# Patient Record
Sex: MALE | Race: WHITE | Employment: OTHER | URBAN - METROPOLITAN AREA
[De-identification: names, ages, dates, MRNs, and addresses within clinical notes are randomized per-mention and may not be internally consistent; named-entity substitution may affect disease eponyms.]

---

## 2024-01-30 RX ORDER — IBUPROFEN 200 MG
200 TABLET ORAL EVERY 6 HOURS PRN
COMMUNITY
End: 2024-02-15

## 2024-01-30 RX ORDER — SIMVASTATIN 20 MG
20 TABLET ORAL NIGHTLY
COMMUNITY

## 2024-01-30 RX ORDER — LORATADINE 10 MG/1
10 TABLET, ORALLY DISINTEGRATING ORAL NIGHTLY
COMMUNITY

## 2024-01-30 RX ORDER — HYDROCODONE BITARTRATE AND ACETAMINOPHEN 5; 325 MG/1; MG/1
1 TABLET ORAL EVERY 6 HOURS PRN
COMMUNITY
End: 2024-02-15

## 2024-01-30 RX ORDER — GABAPENTIN 600 MG/1
600 TABLET ORAL NIGHTLY
COMMUNITY

## 2024-01-30 RX ORDER — ASPIRIN 81 MG/1
81 TABLET ORAL DAILY
Status: ON HOLD | COMMUNITY
End: 2024-02-15

## 2024-01-30 RX ORDER — DEXTROAMPHETAMINE SACCHARATE, AMPHETAMINE ASPARTATE, DEXTROAMPHETAMINE SULFATE AND AMPHETAMINE SULFATE 5; 5; 5; 5 MG/1; MG/1; MG/1; MG/1
20 TABLET ORAL DAILY
COMMUNITY

## 2024-01-30 RX ORDER — LISINOPRIL 5 MG/1
5 TABLET ORAL DAILY
COMMUNITY

## 2024-01-30 RX ORDER — DULAGLUTIDE 4.5 MG/.5ML
1 INJECTION, SOLUTION SUBCUTANEOUS WEEKLY
COMMUNITY

## 2024-01-30 NOTE — PAT NURSING NOTE
Patient took weekly dose of Trulicity on Sunday 28,2024. Spoke with April at Dr. Lee's office re protocol for holding Trulicity. Will await communication from office.

## 2024-02-10 ENCOUNTER — LAB ENCOUNTER (OUTPATIENT)
Dept: LAB | Facility: HOSPITAL | Age: 64
End: 2024-02-10
Attending: NEUROLOGICAL SURGERY
Payer: COMMERCIAL

## 2024-02-10 DIAGNOSIS — Z01.818 PRE-OP TESTING: ICD-10-CM

## 2024-02-10 LAB
ANTIBODY SCREEN: NEGATIVE
RH BLOOD TYPE: POSITIVE
RH BLOOD TYPE: POSITIVE

## 2024-02-10 PROCEDURE — 86850 RBC ANTIBODY SCREEN: CPT | Performed by: NEUROLOGICAL SURGERY

## 2024-02-10 PROCEDURE — 86901 BLOOD TYPING SEROLOGIC RH(D): CPT | Performed by: NEUROLOGICAL SURGERY

## 2024-02-10 PROCEDURE — 86900 BLOOD TYPING SEROLOGIC ABO: CPT | Performed by: NEUROLOGICAL SURGERY

## 2024-02-12 ENCOUNTER — TELEPHONE (OUTPATIENT)
Dept: SURGERY | Facility: CLINIC | Age: 64
End: 2024-02-12

## 2024-02-12 NOTE — TELEPHONE ENCOUNTER
Spoke with patient to inform to bring imaging disc day of procedure. Patient stated he will  a copy of the disc from [facility] and bring with him.

## 2024-02-14 ENCOUNTER — APPOINTMENT (OUTPATIENT)
Dept: GENERAL RADIOLOGY | Facility: HOSPITAL | Age: 64
End: 2024-02-14
Attending: NEUROLOGICAL SURGERY
Payer: COMMERCIAL

## 2024-02-14 ENCOUNTER — ANESTHESIA EVENT (OUTPATIENT)
Dept: SURGERY | Facility: HOSPITAL | Age: 64
End: 2024-02-14
Payer: COMMERCIAL

## 2024-02-14 ENCOUNTER — ANESTHESIA (OUTPATIENT)
Dept: SURGERY | Facility: HOSPITAL | Age: 64
End: 2024-02-14
Payer: COMMERCIAL

## 2024-02-14 ENCOUNTER — HOSPITAL ENCOUNTER (OUTPATIENT)
Facility: HOSPITAL | Age: 64
Discharge: HOME | End: 2024-02-15
Attending: NEUROLOGICAL SURGERY | Admitting: NEUROLOGICAL SURGERY
Payer: COMMERCIAL

## 2024-02-14 DIAGNOSIS — Z98.1 S/P LUMBAR FUSION: Primary | ICD-10-CM

## 2024-02-14 DIAGNOSIS — Z01.818 PRE-OP TESTING: ICD-10-CM

## 2024-02-14 PROBLEM — E78.5 HYPERLIPIDEMIA: Status: ACTIVE | Noted: 2024-02-14

## 2024-02-14 PROBLEM — M48.062 SPINAL STENOSIS OF LUMBAR REGION WITH NEUROGENIC CLAUDICATION: Status: ACTIVE | Noted: 2024-02-14

## 2024-02-14 PROBLEM — I10 ESSENTIAL HYPERTENSION: Status: ACTIVE | Noted: 2024-02-14

## 2024-02-14 PROBLEM — G47.33 OSA (OBSTRUCTIVE SLEEP APNEA): Status: ACTIVE | Noted: 2024-02-14

## 2024-02-14 PROBLEM — E11.9 DIABETES MELLITUS, TYPE 2 (HCC): Status: ACTIVE | Noted: 2024-02-14

## 2024-02-14 LAB
GLUCOSE BLDC GLUCOMTR-MCNC: 143 MG/DL (ref 70–99)
GLUCOSE BLDC GLUCOMTR-MCNC: 226 MG/DL (ref 70–99)
GLUCOSE BLDC GLUCOMTR-MCNC: 228 MG/DL (ref 70–99)
GLUCOSE BLDC GLUCOMTR-MCNC: 287 MG/DL (ref 70–99)

## 2024-02-14 PROCEDURE — 76000 FLUOROSCOPY <1 HR PHYS/QHP: CPT | Performed by: NEUROLOGICAL SURGERY

## 2024-02-14 PROCEDURE — 0SG00AJ FUSION OF LUMBAR VERTEBRAL JOINT WITH INTERBODY FUSION DEVICE, POSTERIOR APPROACH, ANTERIOR COLUMN, OPEN APPROACH: ICD-10-PCS | Performed by: NEUROLOGICAL SURGERY

## 2024-02-14 PROCEDURE — 0ST20ZZ RESECTION OF LUMBAR VERTEBRAL DISC, OPEN APPROACH: ICD-10-PCS | Performed by: NEUROLOGICAL SURGERY

## 2024-02-14 PROCEDURE — 0SG0071 FUSION OF LUMBAR VERTEBRAL JOINT WITH AUTOLOGOUS TISSUE SUBSTITUTE, POSTERIOR APPROACH, POSTERIOR COLUMN, OPEN APPROACH: ICD-10-PCS | Performed by: NEUROLOGICAL SURGERY

## 2024-02-14 DEVICE — SCREW SPNL DIA5.5MM OPN TULIP LOK RELINE: Type: IMPLANTABLE DEVICE | Site: BACK | Status: FUNCTIONAL

## 2024-02-14 DEVICE — IMPLANTABLE DEVICE: Type: IMPLANTABLE DEVICE | Site: BACK | Status: FUNCTIONAL

## 2024-02-14 DEVICE — K WIRE FIX NIT BVL BLNT TIP PRECEPT: Type: IMPLANTABLE DEVICE | Site: BACK

## 2024-02-14 DEVICE — ROD SPNL L40MM DIA5.5MM POST: Type: IMPLANTABLE DEVICE | Site: BACK | Status: FUNCTIONAL

## 2024-02-14 DEVICE — SCREW SPNL L50MM OD6.5MM 2C REDUC RELINE: Type: IMPLANTABLE DEVICE | Site: BACK | Status: FUNCTIONAL

## 2024-02-14 DEVICE — OSTEOCEL PRO LARGE BULK BUY: Type: IMPLANTABLE DEVICE | Site: BACK | Status: FUNCTIONAL

## 2024-02-14 RX ORDER — DEXAMETHASONE SODIUM PHOSPHATE 4 MG/ML
VIAL (ML) INJECTION AS NEEDED
Status: DISCONTINUED | OUTPATIENT
Start: 2024-02-14 | End: 2024-02-14 | Stop reason: SURG

## 2024-02-14 RX ORDER — PROCHLORPERAZINE EDISYLATE 5 MG/ML
5 INJECTION INTRAMUSCULAR; INTRAVENOUS EVERY 8 HOURS PRN
Status: DISCONTINUED | OUTPATIENT
Start: 2024-02-14 | End: 2024-02-14 | Stop reason: HOSPADM

## 2024-02-14 RX ORDER — HYDROCODONE BITARTRATE AND ACETAMINOPHEN 5; 325 MG/1; MG/1
1 TABLET ORAL EVERY 6 HOURS PRN
Status: DISCONTINUED | OUTPATIENT
Start: 2024-02-14 | End: 2024-02-15

## 2024-02-14 RX ORDER — BUPIVACAINE HYDROCHLORIDE AND EPINEPHRINE 2.5; 5 MG/ML; UG/ML
INJECTION, SOLUTION INFILTRATION; PERINEURAL AS NEEDED
Status: DISCONTINUED | OUTPATIENT
Start: 2024-02-14 | End: 2024-02-14 | Stop reason: HOSPADM

## 2024-02-14 RX ORDER — MORPHINE SULFATE 4 MG/ML
2 INJECTION, SOLUTION INTRAMUSCULAR; INTRAVENOUS EVERY 10 MIN PRN
Status: DISCONTINUED | OUTPATIENT
Start: 2024-02-14 | End: 2024-02-14 | Stop reason: HOSPADM

## 2024-02-14 RX ORDER — HYDROCODONE BITARTRATE AND ACETAMINOPHEN 10; 325 MG/1; MG/1
1 TABLET ORAL EVERY 6 HOURS PRN
Qty: 28 TABLET | Refills: 0 | Status: SHIPPED | OUTPATIENT
Start: 2024-02-14 | End: 2024-02-21

## 2024-02-14 RX ORDER — ONDANSETRON 2 MG/ML
4 INJECTION INTRAMUSCULAR; INTRAVENOUS EVERY 6 HOURS PRN
Status: DISCONTINUED | OUTPATIENT
Start: 2024-02-14 | End: 2024-02-14 | Stop reason: HOSPADM

## 2024-02-14 RX ORDER — ENEMA 19; 7 G/133ML; G/133ML
1 ENEMA RECTAL ONCE AS NEEDED
Status: DISCONTINUED | OUTPATIENT
Start: 2024-02-14 | End: 2024-02-15

## 2024-02-14 RX ORDER — PROCHLORPERAZINE EDISYLATE 5 MG/ML
5 INJECTION INTRAMUSCULAR; INTRAVENOUS EVERY 8 HOURS PRN
Status: DISCONTINUED | OUTPATIENT
Start: 2024-02-14 | End: 2024-02-15

## 2024-02-14 RX ORDER — POLYETHYLENE GLYCOL 3350 17 G/17G
17 POWDER, FOR SOLUTION ORAL DAILY PRN
Status: DISCONTINUED | OUTPATIENT
Start: 2024-02-14 | End: 2024-02-15

## 2024-02-14 RX ORDER — ACETAMINOPHEN 500 MG
1000 TABLET ORAL ONCE
Status: COMPLETED | OUTPATIENT
Start: 2024-02-14 | End: 2024-02-14

## 2024-02-14 RX ORDER — LABETALOL HYDROCHLORIDE 5 MG/ML
INJECTION, SOLUTION INTRAVENOUS AS NEEDED
Status: DISCONTINUED | OUTPATIENT
Start: 2024-02-14 | End: 2024-02-14 | Stop reason: SURG

## 2024-02-14 RX ORDER — SODIUM CHLORIDE, SODIUM LACTATE, POTASSIUM CHLORIDE, CALCIUM CHLORIDE 600; 310; 30; 20 MG/100ML; MG/100ML; MG/100ML; MG/100ML
INJECTION, SOLUTION INTRAVENOUS CONTINUOUS
Status: DISCONTINUED | OUTPATIENT
Start: 2024-02-14 | End: 2024-02-15

## 2024-02-14 RX ORDER — DOCUSATE SODIUM 100 MG/1
100 CAPSULE, LIQUID FILLED ORAL 2 TIMES DAILY
Status: DISCONTINUED | OUTPATIENT
Start: 2024-02-14 | End: 2024-02-15

## 2024-02-14 RX ORDER — HYDROMORPHONE HYDROCHLORIDE 1 MG/ML
0.2 INJECTION, SOLUTION INTRAMUSCULAR; INTRAVENOUS; SUBCUTANEOUS EVERY 2 HOUR PRN
Status: DISCONTINUED | OUTPATIENT
Start: 2024-02-14 | End: 2024-02-15

## 2024-02-14 RX ORDER — ONDANSETRON 2 MG/ML
4 INJECTION INTRAMUSCULAR; INTRAVENOUS EVERY 6 HOURS PRN
Status: DISCONTINUED | OUTPATIENT
Start: 2024-02-14 | End: 2024-02-15

## 2024-02-14 RX ORDER — GABAPENTIN 600 MG/1
600 TABLET ORAL NIGHTLY
Status: DISCONTINUED | OUTPATIENT
Start: 2024-02-14 | End: 2024-02-15

## 2024-02-14 RX ORDER — METHOCARBAMOL 750 MG/1
750 TABLET, FILM COATED ORAL 3 TIMES DAILY
Qty: 30 TABLET | Refills: 0 | Status: SHIPPED | OUTPATIENT
Start: 2024-02-14 | End: 2024-02-24

## 2024-02-14 RX ORDER — METHOCARBAMOL 750 MG/1
750 TABLET, FILM COATED ORAL 3 TIMES DAILY PRN
Status: DISCONTINUED | OUTPATIENT
Start: 2024-02-14 | End: 2024-02-15

## 2024-02-14 RX ORDER — HYDROCODONE BITARTRATE AND ACETAMINOPHEN 10; 325 MG/1; MG/1
2 TABLET ORAL EVERY 4 HOURS PRN
Status: DISCONTINUED | OUTPATIENT
Start: 2024-02-14 | End: 2024-02-15

## 2024-02-14 RX ORDER — NICOTINE POLACRILEX 4 MG
30 LOZENGE BUCCAL
Status: DISCONTINUED | OUTPATIENT
Start: 2024-02-14 | End: 2024-02-14 | Stop reason: HOSPADM

## 2024-02-14 RX ORDER — BISACODYL 10 MG
10 SUPPOSITORY, RECTAL RECTAL
Status: DISCONTINUED | OUTPATIENT
Start: 2024-02-14 | End: 2024-02-15

## 2024-02-14 RX ORDER — ROCURONIUM BROMIDE 10 MG/ML
INJECTION, SOLUTION INTRAVENOUS AS NEEDED
Status: DISCONTINUED | OUTPATIENT
Start: 2024-02-14 | End: 2024-02-14 | Stop reason: SURG

## 2024-02-14 RX ORDER — MORPHINE SULFATE 10 MG/ML
6 INJECTION, SOLUTION INTRAMUSCULAR; INTRAVENOUS EVERY 10 MIN PRN
Status: DISCONTINUED | OUTPATIENT
Start: 2024-02-14 | End: 2024-02-14 | Stop reason: HOSPADM

## 2024-02-14 RX ORDER — NICOTINE POLACRILEX 4 MG
15 LOZENGE BUCCAL
Status: DISCONTINUED | OUTPATIENT
Start: 2024-02-14 | End: 2024-02-14 | Stop reason: HOSPADM

## 2024-02-14 RX ORDER — HYDROMORPHONE HYDROCHLORIDE 1 MG/ML
0.2 INJECTION, SOLUTION INTRAMUSCULAR; INTRAVENOUS; SUBCUTANEOUS EVERY 5 MIN PRN
Status: DISCONTINUED | OUTPATIENT
Start: 2024-02-14 | End: 2024-02-14 | Stop reason: HOSPADM

## 2024-02-14 RX ORDER — LISINOPRIL 5 MG/1
5 TABLET ORAL DAILY
Status: DISCONTINUED | OUTPATIENT
Start: 2024-02-15 | End: 2024-02-15

## 2024-02-14 RX ORDER — CEFAZOLIN SODIUM/WATER 2 G/20 ML
2 SYRINGE (ML) INTRAVENOUS ONCE
Status: COMPLETED | OUTPATIENT
Start: 2024-02-14 | End: 2024-02-14

## 2024-02-14 RX ORDER — DIPHENHYDRAMINE HCL 25 MG
25 CAPSULE ORAL EVERY 4 HOURS PRN
Status: DISCONTINUED | OUTPATIENT
Start: 2024-02-14 | End: 2024-02-15

## 2024-02-14 RX ORDER — ATORVASTATIN CALCIUM 10 MG/1
10 TABLET, FILM COATED ORAL NIGHTLY
Status: DISCONTINUED | OUTPATIENT
Start: 2024-02-14 | End: 2024-02-15

## 2024-02-14 RX ORDER — LIDOCAINE HYDROCHLORIDE 10 MG/ML
INJECTION, SOLUTION EPIDURAL; INFILTRATION; INTRACAUDAL; PERINEURAL AS NEEDED
Status: DISCONTINUED | OUTPATIENT
Start: 2024-02-14 | End: 2024-02-14 | Stop reason: SURG

## 2024-02-14 RX ORDER — CETIRIZINE HYDROCHLORIDE 10 MG/1
10 TABLET ORAL NIGHTLY
Status: DISCONTINUED | OUTPATIENT
Start: 2024-02-14 | End: 2024-02-15

## 2024-02-14 RX ORDER — HYDROMORPHONE HYDROCHLORIDE 1 MG/ML
0.6 INJECTION, SOLUTION INTRAMUSCULAR; INTRAVENOUS; SUBCUTANEOUS EVERY 5 MIN PRN
Status: DISCONTINUED | OUTPATIENT
Start: 2024-02-14 | End: 2024-02-14 | Stop reason: HOSPADM

## 2024-02-14 RX ORDER — HYDROMORPHONE HYDROCHLORIDE 1 MG/ML
0.4 INJECTION, SOLUTION INTRAMUSCULAR; INTRAVENOUS; SUBCUTANEOUS EVERY 2 HOUR PRN
Status: DISCONTINUED | OUTPATIENT
Start: 2024-02-14 | End: 2024-02-15

## 2024-02-14 RX ORDER — DIPHENHYDRAMINE HYDROCHLORIDE 50 MG/ML
25 INJECTION INTRAMUSCULAR; INTRAVENOUS EVERY 4 HOURS PRN
Status: DISCONTINUED | OUTPATIENT
Start: 2024-02-14 | End: 2024-02-15

## 2024-02-14 RX ORDER — DEXTROSE MONOHYDRATE 25 G/50ML
50 INJECTION, SOLUTION INTRAVENOUS
Status: DISCONTINUED | OUTPATIENT
Start: 2024-02-14 | End: 2024-02-14 | Stop reason: HOSPADM

## 2024-02-14 RX ORDER — NALOXONE HYDROCHLORIDE 0.4 MG/ML
80 INJECTION, SOLUTION INTRAMUSCULAR; INTRAVENOUS; SUBCUTANEOUS AS NEEDED
Status: DISCONTINUED | OUTPATIENT
Start: 2024-02-14 | End: 2024-02-14 | Stop reason: HOSPADM

## 2024-02-14 RX ORDER — HYDROMORPHONE HYDROCHLORIDE 1 MG/ML
0.4 INJECTION, SOLUTION INTRAMUSCULAR; INTRAVENOUS; SUBCUTANEOUS EVERY 5 MIN PRN
Status: DISCONTINUED | OUTPATIENT
Start: 2024-02-14 | End: 2024-02-14 | Stop reason: HOSPADM

## 2024-02-14 RX ORDER — CEFAZOLIN SODIUM/WATER 2 G/20 ML
2 SYRINGE (ML) INTRAVENOUS EVERY 8 HOURS
Status: COMPLETED | OUTPATIENT
Start: 2024-02-14 | End: 2024-02-15

## 2024-02-14 RX ORDER — MORPHINE SULFATE 4 MG/ML
4 INJECTION, SOLUTION INTRAMUSCULAR; INTRAVENOUS EVERY 10 MIN PRN
Status: DISCONTINUED | OUTPATIENT
Start: 2024-02-14 | End: 2024-02-14 | Stop reason: HOSPADM

## 2024-02-14 RX ORDER — SODIUM CHLORIDE, SODIUM LACTATE, POTASSIUM CHLORIDE, CALCIUM CHLORIDE 600; 310; 30; 20 MG/100ML; MG/100ML; MG/100ML; MG/100ML
INJECTION, SOLUTION INTRAVENOUS CONTINUOUS
Status: DISCONTINUED | OUTPATIENT
Start: 2024-02-14 | End: 2024-02-14 | Stop reason: HOSPADM

## 2024-02-14 RX ORDER — HYDROCODONE BITARTRATE AND ACETAMINOPHEN 10; 325 MG/1; MG/1
2 TABLET ORAL EVERY 6 HOURS PRN
Status: DISCONTINUED | OUTPATIENT
Start: 2024-02-14 | End: 2024-02-14

## 2024-02-14 RX ORDER — SENNOSIDES 8.6 MG
17.2 TABLET ORAL NIGHTLY
Status: DISCONTINUED | OUTPATIENT
Start: 2024-02-14 | End: 2024-02-15

## 2024-02-14 RX ADMIN — SODIUM CHLORIDE, SODIUM LACTATE, POTASSIUM CHLORIDE, CALCIUM CHLORIDE: 600; 310; 30; 20 INJECTION, SOLUTION INTRAVENOUS at 13:48:00

## 2024-02-14 RX ADMIN — LABETALOL HYDROCHLORIDE 5 MG: 5 INJECTION, SOLUTION INTRAVENOUS at 14:19:00

## 2024-02-14 RX ADMIN — LABETALOL HYDROCHLORIDE 5 MG: 5 INJECTION, SOLUTION INTRAVENOUS at 14:08:00

## 2024-02-14 RX ADMIN — CEFAZOLIN SODIUM/WATER 2 G: 2 G/20 ML SYRINGE (ML) INTRAVENOUS at 12:31:00

## 2024-02-14 RX ADMIN — SODIUM CHLORIDE, SODIUM LACTATE, POTASSIUM CHLORIDE, CALCIUM CHLORIDE: 600; 310; 30; 20 INJECTION, SOLUTION INTRAVENOUS at 11:15:00

## 2024-02-14 RX ADMIN — LIDOCAINE HYDROCHLORIDE 50 MG: 10 INJECTION, SOLUTION EPIDURAL; INFILTRATION; INTRACAUDAL; PERINEURAL at 12:22:00

## 2024-02-14 RX ADMIN — LABETALOL HYDROCHLORIDE 5 MG: 5 INJECTION, SOLUTION INTRAVENOUS at 14:32:00

## 2024-02-14 RX ADMIN — DEXAMETHASONE SODIUM PHOSPHATE 8 MG: 4 MG/ML VIAL (ML) INJECTION at 12:37:00

## 2024-02-14 RX ADMIN — ROCURONIUM BROMIDE 10 MG: 10 INJECTION, SOLUTION INTRAVENOUS at 12:22:00

## 2024-02-14 RX ADMIN — SODIUM CHLORIDE, SODIUM LACTATE, POTASSIUM CHLORIDE, CALCIUM CHLORIDE: 600; 310; 30; 20 INJECTION, SOLUTION INTRAVENOUS at 12:24:00

## 2024-02-14 NOTE — DISCHARGE INSTRUCTIONS
Post- Operative Instructions- Lumbar:    Activity-     Avoid bending/twisting at the waist, reaching overhead, lifting more than 5-10 pounds and pushing/pulling more than 10-15 pounds. Log roll to get into and out of bed, instructions attached.    Avoid driving following your procedure. We will make driving recommendations at the time of your post-operative visit 2 weeks following your surgery. Do not drive while taking narcotic pain medication.     Avoid prolonged sitting/standing. Repositioning yourself frequently will prevent stiffness and promote blood flow which decreases your risk for a blood clot.     Avoid NSAID (Non-steroidal Anti-inflammatory Drugs] following your procedure unless approved by your provider. These medications include: Ibuprofen, Aleve, Advil, Meloxicam, Diclofenac, Celebrex.    Take pain medications as needed per instructions. Pain medications may cause constipation. If you experience constipation: drink plenty of water, increase your activity as tolerated and take an over-the-counter stool softener daily as needed.      Specific shower recommendations are listed below under “Incision” and are determined by your surgical closure type. All patients should avoid baths, swimming, hot tubs. Once it is safe for you to shower, do not allow water to spray directly on your incision site. Do not scrub or wash your incision. Gently pat dry following shower and avoid any lotions/creams/perfumes near your surgical site.    If instrumentation was placed during your procedure (cage, screws, plate) we recommend that you take antibiotics prior to any dental procedures for 12 months following your surgery. Antibiotic prescriptions are written and managed by your dentist.      Incision-    With clean hands, remove any surgical dressing 3 days following your procedure. A surgical dressing consists of gauze/large tape. Do not remove any glue or thin steri-strips. Staples and non-dissolvable sutures will be  removed at your two-week post-operative appointment in our clinic.    Surgical Glue will gradually come off on its own. Do not rub or pick at the glue. Surgical glue may darken over time. That is normal. You may shower the day after your procedure.    Steri-Strips are a sterile adhesive which appear tape-like. They are expected to fall off on their own over time. Do not pick at strips. You may shower 4 days after your procedure.      When to contact the clinic:    Drainage at your incision site. It is normal for your surgical dressing to have dried drainage following your procedure. If your incision is consistently leaking new drainage of any kind (bloody, clear, green/yellow tinged) please contact our office to notify your provider.     Changes in your incision site such as increased redness, swelling or warmth to the touch.     Leg pain that is associated with redness, swelling or warmth to the touch.     Bowel or bladder changes including incontinence/inability to make it to the bathroom in time.     New pain If you were experiencing pain prior to surgery, you may experience fluctuating pain levels in the same location as your nerves heal post-operatively. If you experience new pain in a location where you did not have pain prior to surgery, please contact the office to notify your provider.    New or worsening weakness in arms or legs    Fever above 101 degrees Fahrenheit It is normal for your body temperature to increase slightly following a surgical procedure. Please notify your physician if your temperature is above 101 degrees Fahrenheit.

## 2024-02-14 NOTE — PLAN OF CARE
FRANCESCO ANAYA M.D., [email or web address].JADON.JADON.[email or web address].     of Neurosurgery  [facility]  Board Certified Neurosurgeon                              [address]  [address] [place] [address]    PHONE  [phone]          FAX  [phone]    [email or web address]      THORACIC AND/OR LUMBAR FUSION DISCLAIMER AND CONSENT        2/14/2024  10:45 AM    PRE-OPERATIVE CONSULTATION     Derian Villagomez was again informed of the nature of the problem, planned treatment, indications and alternatives.  We discussed the use of hardware, including but not limited to intervertebral spacers, cages, screws, rods, plates, the use of biologics, etc.. We again reviewed the expected benefits of surgery, as well as all reasonably foreseeable risks, including but not limited to infection, bleeding requiring transfusion or reoperation, no relief or worsening of the pain, numbness, weakness, need for assistive devices to get around, injury to the nerves, paralysis, loss of control of the legs/bladder/bowel/sexual function, spinal fluid leak, scar formation, failure of the fusion to heal (made more likely with smoking,) bad position of the screws or cages, migration of the screws or cages, breakage of the screws or rods, potential problems above or below the level of fusion due to increased stress on those levels from the fusion and leading to more pain and possibly the need for more surgery, heart attack, blood clot formation, pulmonary embolism, stroke, coma and death. his questions were all answered and he understands what we discussed.  he also understands that no guarantees can be made regarding outcome or results.  Derian Villagomez agrees the benefits of surgery outweigh the risks, and wishes to proceed with surgery.      Francesco Anaya M.D., JASPAL.ADRIEN.S.

## 2024-02-14 NOTE — CONSULTS
[facility]    PATIENT'S NAME: KI LOMBARDO   ATTENDING PHYSICIAN: Francesco Lee MD   CONSULTING PHYSICIAN: Brittney Ware MD   PATIENT ACCOUNT#:   [id number]    LOCATION:  [facility] PACU 2 [facility] 10  MEDICAL RECORD #:   [id number]       YOB: 1960  ADMISSION DATE:       02/14/2024      CONSULT DATE:  02/14/2024    REPORT OF CONSULTATION      REASON FOR ADMISSION:  Post lumbar transforaminal interbody fusion.    HISTORY OF PRESENT ILLNESS:  Patient is a 63-year-old  male with neurogenic claudication radiating to the right lower extremity, failed outpatient conservative medical management options.  Scheduled today for above-mentioned procedure by his neurosurgeon, Dr. Francesco Lee.  Postprocedure, transferred to PACU for further monitoring.    PAST MEDICAL HISTORY:  Lumbar spinal stenosis with radiculopathy and neurogenic claudication, hypertension, obstructive sleep apnea, attention deficit disorder, diabetes mellitus type 2.    PAST SURGICAL HISTORY:  Hernia repair.    MEDICATIONS:  Please see medication reconciliation list.     ALLERGIES:  No known drug allergies.    SOCIAL HISTORY:  Social alcohol.  No tobacco use.  Cannabinoids vape recreationally.      FAMILY HISTORY:  Positive for hypertension and diabetes mellitus type 2.    REVIEW OF SYSTEMS:  Currently resting in bed.  Back discomfort.  No lower extremity tingling, numbness, or pain.  No chest pain.  No shortness of breath.  Other 12-point review of systems is negative..      PHYSICAL EXAMINATION:    GENERAL:  Alert and oriented to time, place and person.  Mild distress.   VITAL SIGNS:  Temperature 98.4, pulse 78, respiratory rate 12, blood pressure 149/97, pulse ox 95% on room air.  HEENT:  Atraumatic.  Oropharynx clear.  Moist mucous membranes.  Normal hard and soft palate.  Eyes:  Anicteric sclerae.    NECK:  Supple.  No lymphadenopathy.  Trachea midline.  Full range of motion.   LUNGS:  Clear to auscultation  bilaterally.  Normal respiratory effort.    HEART:  Regular rate and rhythm.  S1 and S2 auscultated.  No murmur.    ABDOMEN:  Soft, nondistended.  No tenderness.  Positive bowel sounds.   EXTREMITIES:  No edema, clubbing or cyanosis.   NEUROLOGIC:  Lumbar dressing noted.  Motor and sensory intact.      ASSESSMENT AND PLAN:    1.   Lumbar spinal stenosis with radiculopathy and neurogenic claudication, status post right L4-L5 transforaminal lumbar interbody fusion.  Pain control.  Neuro checks.  DVT prophylaxis.  Physical and occupational therapy.  2.   Diabetes mellitus type 2.  Continue home medications.  Monitor Accu-Cheks.  Sliding scale insulin.  3.   Essential hypertension.  Continue home medications and monitor.   4.   Hyperlipidemia.  Continue home medications.      Dictated By Brittney Ware MD  d: 02/14/2024 15:27:39  t: 02/14/2024 16:32:37  Job [id number]  FB/

## 2024-02-14 NOTE — ANESTHESIA POSTPROCEDURE EVALUATION
Patient: Derian Villagomez    Procedure Summary       Date: 02/14/24 Room / Location: [facility] MAIN OR 09 / [facility] MAIN OR    Anesthesia Start: 1218 Anesthesia Stop:     Procedure: Right L4-5 transforaminal lumbar interbody fusion (Right: Spine Lumbar) Diagnosis: (Lumbar stenosis with neurogenic claudication)    Surgeons: Francesco Lee MD Anesthesiologist: Finn Valadez MD    Anesthesia Type: general ASA Status: 2            Anesthesia Type: general    Vitals Value Taken Time   /87 02/14/24 1454   Temp 97.3 02/14/24 1456   Pulse 87 02/14/24 1456   Resp 12 02/14/24 1456   SpO2 94 % 02/14/24 1456   Vitals shown include unfiled device data.    EM AN Post Evaluation:   Patient Evaluated in PACU  Level of Consciousness: awake and alert and anxious  Pain Score: 0  Pain Management: adequate  Airway Patency:patent  Dental exam unchanged from preop    Nausea/Vomiting: none  Cardiovascular Status: acceptable  Respiratory Status: room air  Postoperative Hydration acceptable  Comments: Report to Meredith Goodwin CRNA  2/14/2024 2:56 PM

## 2024-02-14 NOTE — OPERATIVE REPORT
FRANCESCO ANAYA M.D., F.A.A.N.S.     of Neurosurgery  [facility]  Board Certified Neurosurgeon                              [address]  [address] [place] [address]    PHONE  [phone]          FAX  [phone]    [email or web address]          OPERATIVE REPORT    PATIENT NAME: Derian Villagomez    DATE OF OPERATION:  2/14/2024    PREOPERATIVE DIAGNOSIS:  1. Lumbar stenosis with neurogenic claudication             2. Lumbar spondylolisthesis    POSTOPERATIVE DIAGNOSIS: 1. same               2. same    OPERATIVE PROCEDURE:  1.  Lumbar 4 through 5 transforaminal intervertebral arthrodesis and fusion, utilizing titanium interbodies/cages and allograft and locally-harvested morselized autograft  2.  Lumbar 4 through 5 hemilaminectomy and right complete facetectomy and right posterolateral arthrodesis, utilizing locally-harvested morselized autograft  3.  Lumbar 4 through 5 pedicle screw and maria isabel instrumentation for augmentation of fusion purposes.  4.  Real time intraoperative fluoroscopy and C-arm with the image guidance.  5.  Real time intraoperative motor-evoked potentials, SSEP and nerve monitoring.    CPT CODES: 48423, 19908-57, 98699, 22853x1, 41253, 21526, 63627-31    SURGEON:  Francesco Anaya M.D.    ASSISTANT: Johan Buckner PA-C    ANESTHESIA: General endotracheal anesthesia with TIVA and local anesthetic.    ESTIMATED BLOOD LOSS: 20 cc    INTRAVENOUS FLUIDS AND URINE OUTPUT: Per anesthesia sheet    TRANSFUSION: None    IMPLANTS: Nuvasive    DRAIN: None    SPECIMEN: none    COMPLICATIONS: none    PROCEDURE:    After informed consent was obtained, the patient was brought to the operating room.  After the uneventful induction of general endotracheal anesthesia and placement of the Miner catheter, electrodes and monitoring devices were placed. The patient was then  positioned on the operating room table, an open-frame Song Table, in the prone position. The arms were supported and the neck physiologically extended. All pressure points were adequately padded. The patient's eyes were protected from exposure to prolonged pressure. Baseline electrophysiological potentials were obtained. Subsequently, we utilized lateral and AP fluoroscopic guidance to identify our incision sites relative to the lumbar bony anatomy corresponding to the correct level(s). We identified the incision sites for our tubular retractor placement as well as the percutaneous screws, contralaterally. They were marked out on the skin.  The patient was prepped and draped sterilely.  The C-arm was also draped sterilely into the field. Time-Out was done in the proper fashion. Perioperative antibiosis was given within one hour of incision.     After the \"Time-Out\", we infiltrated the incisions with our usual local anesthetic. We incised utilizing a 10-blade scalpel. The subcutaneous tissues were opened with Bovie cautery down to the fascia.  The fascia was opened sharply using both sharp and blunt dissection. We then used sequential dilators to place a tubular retractor at the L4-5 level along the right laminofacet junction. This was done with fluoroscopic guidance. The tubular retractor was then connected to a table-mounted arm for added stability.     The soft tissues overlying the laminofacet junction were resected utilizing mono- and bipolar electrocautery. We then used a high-speed maria g the create an ipsilateral laminectomy, undercutting the lamina contralaterally in order to accomplish a generous central decompression. The medial ispilateral facet was then drilled in order to decompress the lateral recess.  A high-speed maria g was then utilized to complete the generous laminectomy and the right L4 pars was then transected perpendicularly. This detached the L4 articulating facet en-bloc. We then drilled down  the corresponding L5 articulating surface to expose the L4-5 intervertebral space and disc. The rest of the bony decompression was accomplished utilizing Kerrison rongeurs. After the soft tissues, including the ligamentum flavum, had been resected, we visualized the ipsilaterally exiting L4 and the en-passage L5 nerve roots verifying adequate decompression. The disc space was then re-identified and some epidural veins around the disc space were coagulated and cut sharply with the microscissors.  The disc space was incised and a radical discectomy was performed using a series of Baljinder and Kerrison rongeurs, extending across the midline with angled curettes.  The cartilaginous endplates were then removed using a series of curettes, rongeurs, rasps, and maximo, removing the cartilaginous endplates and roughing up the bony endplates to get good bleeding bone along both surfaces for fusion purposes.  This was inspected with direct visualization and fluoroscopy repeatedly.  The wound was copiously irrigated. The nerves were maintained safe and protected with continued direct visualization. No additional disc or cartilaginous endplate was encountered and very good bleeding bony endplates were seen. We trialed for the properly-sized intervertebral cage and then filled it with allograft. About 9 cc of Osteocel Plus with some of the drilled autologous bone were impacted along the ventral aspect of the disc space and also to the right side of the disc space utilizing a funnel applicator and graft delivery system. While gently and carefully retracting the en-passage nerve root medially, the cage was impacted into position and 75 % expanded to proper alignment. Approximately, 12 degrees of segmental lordosis were gained.  Fluoroscopy confirmed good positioning of the cage. We irrigated the surgical site copiously and hemostasis was obtained with bipolar electrocautery and Flowseal. We then decorticated the ipsilateral  posterolateral elements generously extending from L4 to L5 with the high-speed maria g. The rest of our harvested autograft which had been denuded off all soft tissue and morselized was then packed into the posterolateral space and gutter extending from L4 through L5 for posterolateral arthrodesis purposes. The tubular retractor was then slowly removed, verifying hemostasis at every point of the way meticulously with bipolar electrocautery.      Next, after the corresponding incisions were opened with a 10-blade, a monitored Jamshidi needle was used to cannulate the pedicles from L4 to L5 in the traditional transpedicular fashion. We verified electrophysiological readings at good thresholds. The trajectories were fluoroscopically confirmed. Subsequently, the screws with their extenders were placed over guidewires utilizing fluoroscopic guidance. Finally, we tested and stimulated all screws electrophysiologically at appropriate thresholds.      At this point in the procedure we proceeded to finish the instrumentation. In cases where a segmental spondylolisthesis was present, we asked our anesthesia colleagues now to provide pharmacological paralysis so that the listhesis would be easier to reduce.  Utilizing calipers within the screw extenders, we measured for the properly-sized lordotic rods and placed them appropriately. These were placed through the guide channels of the extenders and easily placed into the screw heads.  Locking nuts were provisionally placed.  Then, using the torque  and the counter-torque device, each end of the maria isabel was secured into the tulip heads of the screws, compressing or reducing the construct lightly before tightening the rods.  The locking nuts were again revisited with the torque  to confirm tightness.  The screw extenders were then removed and final fluoroscopic images documented satisfactory position of the cage, screws, and rods.  The wound was copiously irrigated and small  bleeding points were controlled with a bipolar electrocautery.     The construct was again inspected and found to be quite satisfactory under direct visualization.  Still, more irrigation was used and the lumbodorsal fascia was closed using a series of interrupted 0 Vicryl sutures.  The subcutaneous tissues were copiously irrigated and closed with an interrupted inverted 3-0 Vicryl suture. The skin was closed with 4-0 Vicryl and Dermabond.    There were no complications.  All counts were reported correct. The nerve stimulation of the screws achieved satisfactory thresholds including final placement of the instrumentation.  The patient was returned to the supine position, extubated in the operating room, and taken to the recovery room in satisfactory condition, having tolerated the procedure well and moving all fours strongly to command.    Please fax a copy of this report to my office at [phone] and the Primary Care Provider of this patient.

## 2024-02-14 NOTE — ANESTHESIA PROCEDURE NOTES
Airway  Date/Time: 2/14/2024 12:24 PM    General Information and Staff    Patient location during procedure: OR  Anesthesiologist: Finn Valadez MD  Resident/CRNA: Khadijah Goodwin CRNA  Performed: CRNA   Performed by: Khadijah Goodwin CRNA  Authorized by: Finn Valadez MD      Indications and Patient Condition  Indications for airway management: airway protection and anesthesia  Spontaneous Ventilation: absent  Sedation level: deep  Preoxygenated: yes  Patient position: sniffing  Mask difficulty assessment: 1 - vent by mask    Final Airway Details  Final airway type: endotracheal airway      Successful airway: ETT  Cuffed: yes   Successful intubation technique: Video laryngoscopy  Facilitating devices/methods: intubating stylet  Endotracheal tube insertion site: oral  Blade type: painter.  Blade size: #4  ETT size (mm): 7.5    Cormack-Lehane Classification: grade I - full view of glottis  Measured from: teeth  ETT to teeth (cm): 23  Number of attempts at approach: 1  Ventilation between attempts: none  Number of other approaches attempted: 0

## 2024-02-14 NOTE — ANESTHESIA PREPROCEDURE EVALUATION
Anesthesia PreOp Note    HPI:     Derian Villagomez is a 63 year old male who presents for preoperative consultation requested by: Francesco Lee MD    Date of Surgery: 2/14/2024    Procedure(s):  Right L4-5 transforaminal lumbar interbody fusion  Indication: Lumbar stenosis with neurogenic claudication    Relevant Problems   No relevant active problems       NPO:  Last Liquid Consumption Date: 02/13/24  Last Liquid Consumption Time: 2300  Last Solid Consumption Date: 02/06/24  Last Solid Consumption Time: 2300  Last Liquid Consumption Date: 02/13/24          History Review:  There are no problems to display for this patient.      Past Medical History:   Diagnosis Date    Attention deficit disorder     Back problem     Diabetes (HCC)     High blood pressure     Sleep apnea     has cpap- does not use       Past Surgical History:   Procedure Laterality Date    ARTHROSCOPY OF JOINT UNLISTED Left     HERNIA SURGERY         Medications Prior to Admission   Medication Sig Dispense Refill Last Dose    amphetamine-dextroamphetamine 20 MG Oral Tab Take 1 tablet (20 mg total) by mouth daily.   2/13/2024    aspirin 81 MG Oral Tab EC Take 1 tablet (81 mg total) by mouth daily.   2/8/2024    metFORMIN 500 MG Oral Tab Take 1 tablet (500 mg total) by mouth 2 (two) times daily with meals.   2/12/2024    gabapentin 600 MG Oral Tab Take 1 tablet (600 mg total) by mouth at bedtime.   Past Week    HYDROcodone-acetaminophen 5-325 MG Oral Tab Take 1 tablet by mouth every 6 (six) hours as needed for Pain.   2/13/2024    ibuprofen (ADVIL) 200 MG Oral Tab Take 1 tablet (200 mg total) by mouth every 6 (six) hours as needed for Pain.   2/8/2024    lisinopril 5 MG Oral Tab Take 1 tablet (5 mg total) by mouth daily.   2/12/2024    simvastatin 20 MG Oral Tab Take 1 tablet (20 mg total) by mouth nightly.   Past Week    Dulaglutide (TRULICITY) 4.5 MG/0.5ML Subcutaneous Solution Pen-injector Inject 1 Dose into the skin once a week. Patient  instructed to not take any more trulicity before surgery   2/5/2024    loratadine 10 MG Oral Tablet Dispersible Take 1 tablet (10 mg total) by mouth at bedtime.   Past Week     Current Facility-Administered Medications Ordered in Epic   Medication Dose Route Frequency Provider Last Rate Last Admin    lactated ringers infusion   Intravenous Continuous Francesco Lee MD 20 mL/hr at 02/14/24 1126 Continued by Anesthesia at 02/14/24 1126    ceFAZolin (Ancef) 2 g in 20mL IV syringe premix  2 g Intravenous Once Francesco Lee MD         No current [facility]-ordered outpatient medications on file.       No Known Allergies    Family History   Problem Relation Age of Onset    Diabetes Father      Social History     Socioeconomic History    Marital status:    Tobacco Use    Smoking status: Never    Smokeless tobacco: Never   Vaping Use    Vaping Use: Every day    Substances: THC, CBD   Substance and Sexual Activity    Alcohol use: Yes     Comment: very rare    Drug use: Yes     Types: Cannabis       Available pre-op labs reviewed.     Lab Results   Component Value Date    PGLU 143 (H) 02/14/2024          Vital Signs:  Body mass index is 26.98 kg/m².   height is 1.778 m (5' 10\") and weight is 85.3 kg (188 lb). His oral temperature is 98.1 °F (36.7 °C). His blood pressure is 166/108 (abnormal) and his pulse is 87. His respiration is 18 and oxygen saturation is 95%.   Vitals:    02/09/24 1510 02/14/24 1057   BP:  (!) 166/108   Pulse:  87   Resp:  18   Temp:  98.1 °F (36.7 °C)   TempSrc:  Oral   SpO2:  95%   Weight: 84.4 kg (186 lb) 85.3 kg (188 lb)   Height: 1.778 m (5' 10\")         Anesthesia Evaluation     Patient summary reviewed and Nursing notes reviewed    Airway   Mallampati: I  Dental      Pulmonary - negative ROS   Cardiovascular   Exercise tolerance: good    NYHA Classification: I    Neuro/Psych - negative ROS     GI/Hepatic/Renal - negative ROS     Endo/Other - negative ROS   Abdominal                  Anesthesia Plan:    ASA:  2  Plan:   General  Airway:  ETT  Post-op Pain Management: IV analgesics      I have informed Derian Villagomez and/or legal guardian or family member of the nature of the anesthetic plan, benefits, risks including possible dental damage if relevant, major complications, and any alternative forms of anesthetic management.   All of the patient's questions were answered to the best of my ability. The patient desires the anesthetic management as planned.  MONICO SENA MD  2/14/2024 11:55 AM  Present on Admission:  **None**

## 2024-02-15 ENCOUNTER — TELEPHONE (OUTPATIENT)
Dept: SURGERY | Facility: CLINIC | Age: 64
End: 2024-02-15

## 2024-02-15 VITALS
HEART RATE: 96 BPM | RESPIRATION RATE: 16 BRPM | WEIGHT: 188 LBS | HEIGHT: 70 IN | SYSTOLIC BLOOD PRESSURE: 145 MMHG | DIASTOLIC BLOOD PRESSURE: 74 MMHG | TEMPERATURE: 99 F | BODY MASS INDEX: 26.92 KG/M2 | OXYGEN SATURATION: 97 %

## 2024-02-15 LAB
EST. AVERAGE GLUCOSE BLD GHB EST-MCNC: 157 MG/DL (ref 68–126)
GLUCOSE BLDC GLUCOMTR-MCNC: 165 MG/DL (ref 70–99)
HBA1C MFR BLD: 7.1 % (ref ?–5.7)

## 2024-02-15 PROCEDURE — 99024 POSTOP FOLLOW-UP VISIT: CPT | Performed by: NEUROLOGICAL SURGERY

## 2024-02-15 RX ORDER — POLYETHYLENE GLYCOL 3350 17 G/17G
17 POWDER, FOR SOLUTION ORAL DAILY PRN
Status: SHIPPED | COMMUNITY
Start: 2024-02-15

## 2024-02-15 RX ORDER — ASPIRIN 81 MG/1
81 TABLET ORAL DAILY
Status: SHIPPED | COMMUNITY
Start: 2024-02-15

## 2024-02-15 RX ORDER — PSEUDOEPHEDRINE HCL 30 MG
100 TABLET ORAL 2 TIMES DAILY
Status: SHIPPED | COMMUNITY
Start: 2024-02-15

## 2024-02-15 NOTE — PROGRESS NOTES
FRANCESCO ANAYA M.D., F.A.A.N.S.     of Neurosurgery  [facility] [place], [place]  Board Certified Neurosurgeon                              [facility]      [address]  [address]  [place] [address]    PHONE  [phone]          FAX  [phone]    [email or web address]      NEUROSURGERY PROGRESS NOTE          Derian Villagomez Patient Status:  Outpatient in a Bed    1960 MRN [id number]   Location [facility] Attending Francesco Anaya MD   Hosp Day # 0 PCP Jose Alberto Javier MD     Subjective:  Derian Villagomze is a(n) 63 year old male.  Alert, orientated x3.  Cooperative.  No apparent distress.  Doing well with resolution of preop radiculopathy    Vital Signs:    Temp:  [97.3 °F (36.3 °C)-98.6 °F (37 °C)] 98.4 °F (36.9 °C)  Pulse:  [72-99] 99  Resp:  [10-18] 16  BP: (125-174)/() 126/59  SpO2:  [94 %-99 %] 97 %    I/O:  I/O last 3 completed shifts:  In: 1000 [I.V.:1000]  Out: 2225 [Urine:; Blood:150]    Inpatient Medications:    Current Facility-Administered Medications:     lactated ringers infusion, , Intravenous, Continuous    methocarbamol (Robaxin) tab 750 mg, 750 mg, Oral, TID PRN    sennosides (Senokot) tab 17.2 mg, 17.2 mg, Oral, Nightly    docusate sodium (Colace) cap 100 mg, 100 mg, Oral, BID    polyethylene glycol (PEG 3350) (Miralax) 17 g oral packet 17 g, 17 g, Oral, Daily PRN    magnesium hydroxide (Milk of Magnesia) 400 MG/5ML oral suspension 30 mL, 30 mL, Oral, Daily PRN    bisacodyl (Dulcolax) 10 MG rectal suppository 10 mg, 10 mg, Rectal, Daily PRN    fleet enema (Fleet) 7-19 GM/118ML rectal enema 133 mL, 1 enema, Rectal, Once PRN    ondansetron (Zofran) 4 MG/2ML injection 4 mg, 4 mg, Intravenous, Q6H PRN    prochlorperazine (Compazine) 10 MG/2ML injection 5 mg, 5 mg, Intravenous, Q8H PRN    diphenhydrAMINE (Benadryl) cap/tab 25 mg, 25 mg, Oral, Q4H PRN **OR**  diphenhydrAMINE (Benadryl) 50 mg/mL  injection 25 mg, 25 mg, Intravenous, Q4H PRN    benzocaine-menthol (Cepacol) lozenge 1 lozenge, 1 lozenge, Buccal, Q15 Min PRN    HYDROmorphone (Dilaudid) 1 MG/ML injection 0.2 mg, 0.2 mg, Intravenous, Q2H PRN **OR** HYDROmorphone (Dilaudid) 1 MG/ML injection 0.4 mg, 0.4 mg, Intravenous, Q2H PRN    HYDROcodone-acetaminophen (Norco) 5-325 MG per tab 1 tablet, 1 tablet, Oral, Q6H PRN    gabapentin (Neurontin) tab 600 mg, 600 mg, Oral, Nightly    lisinopril (Prinivil; Zestril) tab 5 mg, 5 mg, Oral, Daily    cetirizine (ZyrTEC) tab 10 mg, 10 mg, Oral, Nightly    metFORMIN (Glucophage) tab 500 mg, 500 mg, Oral, BID with meals    atorvastatin (Lipitor) tab 10 mg, 10 mg, Oral, Nightly    HYDROcodone-acetaminophen (Norco)  MG per tab 2 tablet, 2 tablet, Oral, Q4H PRN    insulin aspart (NovoLOG) 100 Units/mL FlexPen 1-7 Units, 1-7 Units, Subcutaneous, TID CC and HS    Labs:  No results found for: \"WBC\", \"HGB\", \"PLT\", \"BUN\", \"NA\", \"K\", \"CO2\", \"GLU\", \"ALB\", \"PTT\", \"INR\", \"PT\", \"CRP\", \"ESRML\", \"ESRPF\"      Neurological Exam:  AAOX3, following commands  PERRLA  EOMI  MAEx4, 5/5 AMG  Sensation symmetrical  Incsisions c/d/i  Abdomen:  Soft, non-distended, non-tender, with no rebound or guarding.  No peritoneal signs.   Extremities:  Non-tender, no lower extremity edema noted.        Imaging:  XR FLUOROSCOPY C-ARM TIME LESS THAN 1 HOUR (CPT=76000)    Result Date: 2/14/2024  CONCLUSION:  1. Fluoroscopy for L4-5 transforaminal lumbar interbody fusion.    Dictated by (CST): Jeferson Ring MD on 2/14/2024 at 8:39 PM     Finalized by (CST): Jeferson Ring MD on 2/14/2024 at 8:40 PM           Assessment/Plan:  Principal Problem:    Spinal stenosis of lumbar region with neurogenic claudication  Active Problems:    Essential hypertension    Hyperlipidemia    Diabetes mellitus, type 2 (HCC)    SUMMER (obstructive sleep apnea)    S/P lumbar fusion    POD#1 s/p L4-5 TLIF, doing well.   Mobilize with  PTOT  Dispo  F/u in 2 weeks  D/w patient and nursing staff.    All questions and concerns were addressed. We appreciate the opportunity to participate in the care of this patient. Please do not hesitate to call our office ([phone]) with any issues.          Francesco Lee M.D., F.A.A.N.S.    2/15/2024  8:35 AM

## 2024-02-15 NOTE — PHYSICAL THERAPY NOTE
PHYSICAL THERAPY EVALUATION - INPATIENT     Room Number: Room 6/Room 6-A  Evaluation Date: 2/15/2024  Type of Evaluation: Initial   Physician Order: PT Eval and Treat    Presenting Problem: S/P L4-5 fusion 2/14/24  Co-Morbidities : DM2, SUMMER, lumbar stenosis with radiculopathy and neurogenic claudication  Reason for Therapy: Mobility Dysfunction and Discharge Planning    PHYSICAL THERAPY ASSESSMENT   Patient is a 63 year old male admitted 2/14/2024 for S/P L4-5 fusion.  Prior to admission, patient's baseline is independent, golfs and cycles.  Patient is currently functioning near baseline with bed mobility, transfers, gait, and stair negotiation.  Patient is requiring supervision and stand-by assist as a result of the following impairments: pain.  Pt progressed to independent with stair trial, ambulating household and community distances without a device.     Patient verbalizes no further mobility concerns at this time, is functioning safely with mobility to D/C at this level of care. Physical Therapy to sign off at this time, please re-consult if patient experiences a decline in functional status. Anticipate patient to return home with OP PT once cleared.      PLAN  Patient has been evaluated and presents with no skilled Physical Therapy needs at this time.  Patient will be discharged from Physical Therapy services. Please re-order if a new functional limitation presents during this admission.    PHYSICAL THERAPY MEDICAL/SOCIAL HISTORY       Problem List  Principal Problem:    Spinal stenosis of lumbar region with neurogenic claudication  Active Problems:    Essential hypertension    Hyperlipidemia    Diabetes mellitus, type 2 (HCC)    SUMMER (obstructive sleep apnea)    S/P lumbar fusion      HOME SITUATION  Home Situation  Type of Home: House  Home Layout: One level  Stairs to Enter : 0  Lives With: Spouse  Drives: Yes  Patient Owned Equipment: Cane  Patient Regularly Uses: Glasses;Reading glasses     Prior Level of  [place]: independent with no device, golfs, cycles    SUBJECTIVE  \"I am concerned about my bed\"     PHYSICAL THERAPY EXAMINATION   OBJECTIVE  Precautions: Spine  Fall Risk: Standard fall risk    WEIGHT BEARING RESTRICTION  Weight Bearing Restriction: None                PAIN ASSESSMENT  Ratin  Location: incision pain  Management Techniques: Activity promotion;Body mechanics;Relaxation  COGNITION  Overall Cognitive Status:  WFL - within functional limits    RANGE OF MOTION AND STRENGTH ASSESSMENT  Upper extremity ROM and strength are within functional limits  BUE within spinal precautions   Lower extremity ROM is within functional limits  BLE within spinal precautions   Lower extremity strength is within functional limits  BLE within spinal precautions     BALANCE  Static Sitting: Good  Dynamic Sitting: Good  Static Standing: Fair +  Dynamic Standing: Fair +      NEUROLOGICAL FINDINGS        Coordination - Rapid Alternating Movement: Symmetrical (finger to finger symmetrical)  Sensation: WFL bilat LE to light touch              AM-PAC '6-Clicks' INPATIENT SHORT FORM - BASIC MOBILITY  How much difficulty does the patient currently have...  Patient Difficulty: Turning over in bed (including adjusting bedclothes, sheets and blankets)?: None   Patient Difficulty: Sitting down on and standing up from a chair with arms (e.g., wheelchair, bedside commode, etc.): None   Patient Difficulty: Moving from lying on back to sitting on the side of the bed?: A Little   How much help from another person does the patient currently need...   Help from Another: Moving to and from a bed to a chair (including a wheelchair)?: None   Help from Another: Need to walk in hospital room?: None   Help from Another: Climbing 3-5 steps with a railing?: A Little     AM-PAC Score:  Raw Score: 22   Approx Degree of Impairment: 20.91%   Standardized Score (AM-PAC Scale): 53.28   CMS Modifier (G-Code): CJ    FUNCTIONAL ABILITY  STATUS  Functional Mobility/Gait Assessment  Gait Assistance: Supervision;Independent  Distance (ft): 200  Assistive Device: None;Rolling walker (RW for first 10', then no device)  Pattern: Within Functional Limits;Comment;Shuffle (moderate ines, dec step length bilat, upright posture, no LOB)  Stairs: Stairs  How Many Stairs: 8  Device: 2 Rails  Assist: Contact guard assist (progressed to supervision)  Pattern: Ascend and Descend  Ascend and Descend : Step to  Rolling: independent and supervision log roll  Supine to Sit: independent and stand-by assist cues and demonstration for sequencing   Sit to Supine: independent and stand-by assist  Sit to Stand: independent and stand-by assist cues for wide base of support, hinging at hips   ADDITIONAL SESSION DETAIL  Provided pt handout on surgical precautions and functional mobility.     Patient received  standing at maria eugenia in room , agreeable to physical therapy evaluation. Vital signs monitored as noted above, no adverse symptoms and patient stable during session. Education with patient and wife provided on Spine precautions, Physical therapy plan of care, and physiological benefits of out of bed mobility.     Patient history and/or personal factors that may impact the plan of care include longstanding history of pain. Based on the physical therapy examination of the noted systems and functional activity/participation limitations, the patient presentation is stable given the patient demonstrates no significant barriers to meeting therapy goals.    Exercise/Education Provided:  Bed mobility  Body mechanics  Energy conservation  Functional activity tolerated  Gait training  Posture  ROM  Transfer training    The patient's Approx Degree of Impairment: 20.91% has been calculated based on documentation in the [facility] '6 clicks' Inpatient Basic Mobility Short Form.  Research supports that patients with this level of impairment may benefit from no needs, however recommend OP PT  once medically cleared.    Final disposition will be made by interdisciplinary medical team.    Patient End of Session: Up in chair;Needs met;Call light within reach;RN aware of session/findings;All patient questions and concerns addressed;Ice applied;Family present (wife)    Patient was able to achieve the following ...   Patient able to transfer  Safely and independently    Patient able to ambulate on level surfaces  Safely and independently     Patient Evaluation Complexity Level:  History High - 3 or more personal factors and/or co-morbidities   Examination of body systems Moderate - addressing a total of 3 or more elements   Clinical Presentation Low- Stable   Clinical Decision Making  Low Complexity     Gait Training: 15 minutes  Therapeutic Activity:  15 minutes    KEVIN Haskins  [facility] [facility]  DPT Candidate 2024     I provided clinical instruction and supervision for the duration of this session and agree with the above documentation.    Dina Bañuelos, PT, DPT  [facility]

## 2024-02-15 NOTE — PLAN OF CARE
Problem: Patient Centered Care  Goal: Patient preferences are identified and integrated in the patient's plan of care  Description: Interventions:  - What would you like us to know as we care for you? Patient is from home with his wife and she is his support system.  - Provide timely, complete, and accurate information to patient/family  - Incorporate patient and family knowledge, values, beliefs, and cultural backgrounds into the planning and delivery of care  - Encourage patient/family to participate in care and decision-making at the level they choose  - Honor patient and family perspectives and choices  Outcome: Progressing     Problem: Diabetes/Glucose Control  Goal: Glucose maintained within prescribed range  Description: INTERVENTIONS:  - Monitor Blood Glucose as ordered  - Assess for signs and symptoms of hyperglycemia and hypoglycemia  - Administer ordered medications to maintain glucose within target range  - Assess barriers to adequate nutritional intake and initiate nutrition consult as needed  - Instruct patient on self management of diabetes  Outcome: Progressing     Problem: Patient/Family Goals  Goal: Patient/Family Long Term Goal  Description: Patient's Long Term Goal: Patient will have his pain managed with oral medication and will not have any complications from surgery.    Interventions:  - No bending, heavy lifting nor twisting above waist area.  - Log rolling in and out of bed.  - Pain managed with oral medication.  - May ice incision to prevent swelling or help reduce pain.  - Up with walker as much as tolerated.  - KRYS hose to both legs to prevent blood clots.  - See additional Care Plan goals for specific interventions  Outcome: Progressing  Goal: Patient/Family Short Term Goal  Description: Patient's Short Term Goal: Home when stable.    Interventions:   - No bending, heavy lifting nor twisting above waist area.  - Log rolling in and out of bed.  - Pain managed with oral medication.  -  May ice incision to prevent swelling or help reduce pain.  - Up with walker as much as tolerated.  - SCDs and KRYS hose to both legs to prevent blood clots.  - PT/OT as ordered.  - See additional Care Plan goals for specific interventions  Outcome: Progressing     Problem: PAIN - ADULT  Goal: Verbalizes/displays adequate comfort level or patient's stated pain goal  Description: INTERVENTIONS:  - Encourage pt to monitor pain and request assistance  - Assess pain using appropriate pain scale  - Administer analgesics based on type and severity of pain and evaluate response  - Implement non-pharmacological measures as appropriate and evaluate response  - Consider cultural and social influences on pain and pain management  - Manage/alleviate anxiety  - Utilize distraction and/or relaxation techniques  - Monitor for opioid side effects  - Notify MD/LIP if interventions unsuccessful or patient reports new pain  - Anticipate increased pain with activity and pre-medicate as appropriate  Outcome: Progressing     Problem: RISK FOR INFECTION - ADULT  Goal: Absence of fever/infection during anticipated neutropenic period  Description: INTERVENTIONS  - Monitor WBC  - Administer growth factors as ordered  - Implement neutropenic guidelines  Outcome: Progressing     Problem: SAFETY ADULT - FALL  Goal: Free from fall injury  Description: INTERVENTIONS:  - Assess pt frequently for physical needs  - Identify cognitive and physical deficits and behaviors that affect risk of falls.  - [place] fall precautions as indicated by assessment.  - Educate pt/family on patient safety including physical limitations  - Instruct pt to call for assistance with activity based on assessment  - Modify environment to reduce risk of injury  - Provide assistive devices as appropriate  - Consider OT/PT consult to assist with strengthening/mobility  - Encourage toileting schedule  Outcome: Progressing     Problem: DISCHARGE PLANNING  Goal: Discharge to  home or other facility with appropriate resources  Description: INTERVENTIONS:  - Identify barriers to discharge w/pt and caregiver  - Include patient/family/discharge partner in discharge planning  - Arrange for needed discharge resources and transportation as appropriate  - Identify discharge learning needs (meds, wound care, etc)  - Arrange for interpreters to assist at discharge as needed  - Consider post-discharge preferences of patient/family/discharge partner  - Complete POLST form as appropriate  - Assess patient's ability to be responsible for managing their own health  - Refer to Case Management Department for coordinating discharge planning if the patient needs post-hospital services based on physician/LIP order or complex needs related to functional status, cognitive ability or social support system  Outcome: Progressing     Problem: GENITOURINARY - ADULT  Goal: Absence of urinary retention  Description: INTERVENTIONS:  - Assess patient’s ability to void and empty bladder  - Monitor intake/output and perform bladder scan as needed  - Follow urinary retention protocol/standard of care  - Consider collaborating with pharmacy to review patient's medication profile  - Implement strategies to promote bladder emptying  Outcome: Progressing     Problem: SKIN/TISSUE INTEGRITY - ADULT  Goal: Incision(s), wounds(s) or drain site(s) healing without S/S of infection  Description: INTERVENTIONS:  - Assess and document risk factors for pressure ulcer development  - Assess and document skin integrity  - Assess and document dressing/incision, wound bed, drain sites and surrounding tissue  - Implement wound care per orders  - Initiate isolation precautions as appropriate  - Initiate Pressure Ulcer prevention bundle as indicated  Outcome: Progressing     Problem: MUSCULOSKELETAL - ADULT  Goal: Return mobility to safest level of function  Description: INTERVENTIONS:  - Assess patient stability and activity tolerance for  standing, transferring and ambulating w/ or w/o assistive devices  - Assist with transfers and ambulation using safe patient handling equipment as needed  - Ensure adequate protection for wounds/incisions during mobilization  - Obtain PT/OT consults as needed  - Advance activity as appropriate  - Communicate ordered activity level and limitations with patient/family  Outcome: Progressing  Goal: Maintain proper alignment of affected body part  Description: INTERVENTIONS:  - Support and protect limb and body alignment per provider's orders  - Instruct and reinforce with patient and family use of appropriate assistive device and precautions (e.g. spinal or hip dislocation precautions)  Outcome: Progressing    Patient is alert and oriented, aware to call for help as needed.  Patient is currently in room air, denies shortness of breathing nor chest pain.  Patient was up in the chair upon arrival from PACU then back to bed now.  Patient takes oral medication for pain.  Patient has a hooker catheter draining to yellow colored urine in good amount.  Patient will be going home when stable hopefully tomorrow.

## 2024-02-15 NOTE — PLAN OF CARE
Patient is alert and oriented. RA. Tele in place. SCDs on for DVT prophylaxis. Miner removed this AM, patient will be check void. Up x1 with walker. Incision CDI, shaggy. Gel ice packs. PRN Dilaudid, norco and robaxin given for pain management. Call light within reach.  Problem: Patient Centered Care  Goal: Patient preferences are identified and integrated in the patient's plan of care  Description: Interventions:  - What would you like us to know as we care for you? Patient is from home with his wife and she is his support system.  - Provide timely, complete, and accurate information to patient/family  - Incorporate patient and family knowledge, values, beliefs, and cultural backgrounds into the planning and delivery of care  - Encourage patient/family to participate in care and decision-making at the level they choose  - Honor patient and family perspectives and choices  Outcome: Progressing     Problem: Diabetes/Glucose Control  Goal: Glucose maintained within prescribed range  Description: INTERVENTIONS:  - Monitor Blood Glucose as ordered  - Assess for signs and symptoms of hyperglycemia and hypoglycemia  - Administer ordered medications to maintain glucose within target range  - Assess barriers to adequate nutritional intake and initiate nutrition consult as needed  - Instruct patient on self management of diabetes  Outcome: Progressing     Problem: Patient/Family Goals  Goal: Patient/Family Long Term Goal  Description: Patient's Long Term Goal: Patient will have his pain managed with oral medication and will not have any complications from surgery.    Interventions:  - No bending, heavy lifting nor twisting above waist area.  - Log rolling in and out of bed.  - Pain managed with oral medication.  - May ice incision to prevent swelling or help reduce pain.  - Up with walker as much as tolerated.  - KRYS hose to both legs to prevent blood clots.  - See additional Care Plan goals for specific interventions  Outcome:  Progressing  Goal: Patient/Family Short Term Goal  Description: Patient's Short Term Goal: Home when stable.    Interventions:   - No bending, heavy lifting nor twisting above waist area.  - Log rolling in and out of bed.  - Pain managed with oral medication.  - May ice incision to prevent swelling or help reduce pain.  - Up with walker as much as tolerated.  - SCDs and KRYS hose to both legs to prevent blood clots.  - PT/OT as ordered.  - See additional Care Plan goals for specific interventions  Outcome: Progressing     Problem: PAIN - ADULT  Goal: Verbalizes/displays adequate comfort level or patient's stated pain goal  Description: INTERVENTIONS:  - Encourage pt to monitor pain and request assistance  - Assess pain using appropriate pain scale  - Administer analgesics based on type and severity of pain and evaluate response  - Implement non-pharmacological measures as appropriate and evaluate response  - Consider cultural and social influences on pain and pain management  - Manage/alleviate anxiety  - Utilize distraction and/or relaxation techniques  - Monitor for opioid side effects  - Notify MD/LIP if interventions unsuccessful or patient reports new pain  - Anticipate increased pain with activity and pre-medicate as appropriate  Outcome: Progressing     Problem: RISK FOR INFECTION - ADULT  Goal: Absence of fever/infection during anticipated neutropenic period  Description: INTERVENTIONS  - Monitor WBC  - Administer growth factors as ordered  - Implement neutropenic guidelines  Outcome: Progressing     Problem: SAFETY ADULT - FALL  Goal: Free from fall injury  Description: INTERVENTIONS:  - Assess pt frequently for physical needs  - Identify cognitive and physical deficits and behaviors that affect risk of falls.  - [place] fall precautions as indicated by assessment.  - Educate pt/family on patient safety including physical limitations  - Instruct pt to call for assistance with activity based on  assessment  - Modify environment to reduce risk of injury  - Provide assistive devices as appropriate  - Consider OT/PT consult to assist with strengthening/mobility  - Encourage toileting schedule  Outcome: Progressing     Problem: DISCHARGE PLANNING  Goal: Discharge to home or other facility with appropriate resources  Description: INTERVENTIONS:  - Identify barriers to discharge w/pt and caregiver  - Include patient/family/discharge partner in discharge planning  - Arrange for needed discharge resources and transportation as appropriate  - Identify discharge learning needs (meds, wound care, etc)  - Arrange for interpreters to assist at discharge as needed  - Consider post-discharge preferences of patient/family/discharge partner  - Complete POLST form as appropriate  - Assess patient's ability to be responsible for managing their own health  - Refer to Case Management Department for coordinating discharge planning if the patient needs post-hospital services based on physician/LIP order or complex needs related to functional status, cognitive ability or social support system  Outcome: Progressing     Problem: GENITOURINARY - ADULT  Goal: Absence of urinary retention  Description: INTERVENTIONS:  - Assess patient’s ability to void and empty bladder  - Monitor intake/output and perform bladder scan as needed  - Follow urinary retention protocol/standard of care  - Consider collaborating with pharmacy to review patient's medication profile  - Implement strategies to promote bladder emptying  Outcome: Progressing     Problem: SKIN/TISSUE INTEGRITY - ADULT  Goal: Incision(s), wounds(s) or drain site(s) healing without S/S of infection  Description: INTERVENTIONS:  - Assess and document risk factors for pressure ulcer development  - Assess and document skin integrity  - Assess and document dressing/incision, wound bed, drain sites and surrounding tissue  - Implement wound care per orders  - Initiate isolation  precautions as appropriate  - Initiate Pressure Ulcer prevention bundle as indicated  Outcome: Progressing     Problem: MUSCULOSKELETAL - ADULT  Goal: Return mobility to safest level of function  Description: INTERVENTIONS:  - Assess patient stability and activity tolerance for standing, transferring and ambulating w/ or w/o assistive devices  - Assist with transfers and ambulation using safe patient handling equipment as needed  - Ensure adequate protection for wounds/incisions during mobilization  - Obtain PT/OT consults as needed  - Advance activity as appropriate  - Communicate ordered activity level and limitations with patient/family  Outcome: Progressing  Goal: Maintain proper alignment of affected body part  Description: INTERVENTIONS:  - Support and protect limb and body alignment per provider's orders  - Instruct and reinforce with patient and family use of appropriate assistive device and precautions (e.g. spinal or hip dislocation precautions)  Outcome: Progressing

## 2024-02-15 NOTE — TELEPHONE ENCOUNTER
Received call from pharmacist Celena at [facility] regarding norco 10mg rx prescribed yesterday for post op pain.  He is s/p Right L4-5 TLIF performed on 2-14-24 by Dr Lee.  The pharmacist states the patient is also being prescribed norco 5mg and filled #120 on 1-26-24.  She had called the patient who told her he was already out of the 5 mg (30 day supply) and the pharmacist wanted to know why he was being prescribed more.  Explained that patient had surgery yesterday and needs additional pain management.  She would like approval from EDISON Buckner as he prescribed the rx.  She also asked for the attending MD Dr Lee's STEVE.      Discussed with EDIOSN Buckner.  He states the patient had increased pain leading up to surgery and was taking 2 tabs of norco 5 mg at a time so was out early and needs a refill for post op pain.      Called [facility] back to inform.

## 2024-02-16 NOTE — OCCUPATIONAL THERAPY NOTE
OCCUPATIONAL THERAPY EVALUATION - INPATIENT     Room Number: Room 6/Room 6-A  Evaluation Date: 2/15/2024  Type of Evaluation: Initial  Presenting Problem: L4-5 TLIF    Physician Order: IP Consult to Occupational Therapy  Reason for Therapy: ADL/IADL Dysfunction and Discharge Planning    OCCUPATIONAL THERAPY ASSESSMENT   Patient is a 63 year old male admitted 2024.  Prior to admission, patient's baseline is independent.  Patient is currently functioning near baseline with ADLs.  Patient is requiring modified independent and supervision as a result of the following impairments: pain and back prec .     Patient does not require skilled OT Services For duration of hospitalization, given the patient is functioning near baseline level do not anticipate skilled therapy needs at discharge     PLAN  Patient has been evaluated and presents with no skilled Occupational Therapy needs  at this time.  Patient will be discharged from Occupational Therapy services. Please re-order if a new functional limitation presents during this admission.  OT Device Recommendations: Reacher    OCCUPATIONAL THERAPY MEDICAL/SOCIAL HISTORY   Problem List  Principal Problem:    Spinal stenosis of lumbar region with neurogenic claudication  Active Problems:    Essential hypertension    Hyperlipidemia    Diabetes mellitus, type 2 (HCC)    SUMMER (obstructive sleep apnea)    S/P lumbar fusion    HOME SITUATION  Type of Home: House  Home Layout: One level  Lives With: Spouse  Toilet and Equipment: Standard height toilet  Drives: Yes  Patient Regularly Uses: Reading glasses      Prior Level of [place]: independent    SUBJECTIVE  \"I have been in pain for so long, I have been modifying all of my daily activities\"    OCCUPATIONAL THERAPY EXAMINATION    OBJECTIVE  Precautions: Spine  Fall Risk: Standard fall risk    WEIGHT BEARING RESTRICTION     PAIN ASSESSMENT  Ratin  Location: back  Management Techniques: Ice         COGNITION  Overall  Cognitive Status:  WFL - within functional limits      Communication: WFL    Behavioral/Emotional/Social: WFL    RANGE OF MOTION   Upper extremity ROM is within functional limits     STRENGTH ASSESSMENT  Upper extremity strength is within functional limits     COORDINATION  Gross Motor: WFL   Fine Motor: WFL     ACTIVITIES OF DAILY LIVING ASSESSMENT  AM-PAC ‘6-Clicks’ Inpatient Daily Activity Short Form  How much help from another person does the patient currently need…  -   Putting on and taking off regular lower body clothing?: None  -   Bathing (including washing, rinsing, drying)?: A Little  -   Toileting, which includes using toilet, bedpan or urinal? : None  -   Putting on and taking off regular upper body clothing?: None  -   Taking care of personal grooming such as brushing teeth?: None  -   Eating meals?: None    AM-PAC Score:  Score: 23  Approx Degree of Impairment: 15.86%  Standardized Score (AM-PAC Scale): 51.12  CMS Modifier (G-Code): CI    FUNCTIONAL TRANSFER ASSESSMENT  Sit to Stand: Chair  Chair: Modified Independent       BALANCE ASSESSMENT  Static Sitting: Independent  Static Standing: Independent    FUNCTIONAL ADL ASSESSMENT  Eating: Independent  Grooming Standing: Modified Independent (simulated reaching in standing, cues to reinforce maintaining back prec during ADLs)  LB Dressing Seated: Modified Independent (able to achieve figure four)  Toileting Standing: Modified Independent (simulated reaching in standing, cues to reinforce/maintain back prec)       Skilled Therapy Provided:   OT PPE includes: gloves, and surgical mask. RN cleared pt for OT. Wife present during session, educated on back prec and how to maintain during ADLs-pt and wife with good understanding. Educated on safe functional transfers car/shower. Demo'd out of chair with SPV-mod I. All questions answered    EDUCATION PROVIDED  Patient: Role of Occupational Therapy; Plan of Care; Discharge Recommendations; Adaptive Equipment  Recommendations; Posture/Positioning; Surgical Precautions; Proper Body Mechanics  Patient's Response to Education: Verbalized Understanding; Returned Demonstration  Family/Caregiver: Role of Occupational Therapy; Plan of Care; Discharge Recommendations; Adaptive Equipment Recommendations; Compensatory ADL Techniques; Surgical Precautions; Proper Body Mechanics  Family/Caregiver's Response to Education: Verbalized Understanding; Returned Demonstration    The patient's Approx Degree of Impairment: 15.86% has been calculated based on documentation in the [facility] '6 clicks' Inpatient Daily Activity Short Form.  Research supports that patients with this level of impairment may benefit from none  .  Final disposition will be made by interdisciplinary medical team.    Patient End of Session: Up in chair;Call light within reach;Needs met;RN aware of session/findings;All patient questions and concerns addressed;Family present    Patient was able to achieve the following ...   Patient able to toilet transfer  safely and independently    Patient able to dress lower extremities  safely and independently    Patient/Caregiver able to demonstrate safety with ADLS  safely and independently     Patient Evaluation Complexity Level:   Occupational Profile/Medical History LOW - Brief history including review of medical or therapy records    Specific performance deficits impacting engagement in ADL/IADL LOW  1 - 3 performance deficits    Client Assessment/Performance Deficits LOW - No comorbidities nor modifications of tasks    Clinical Decision Making LOW - Analysis of occupational profile, problem-focused assessments, limited treatment options    Overall Complexity LOW     OT Session Time: 15 minutes  15 min KATE Ballard/ORIANA

## 2024-02-19 ENCOUNTER — TELEPHONE (OUTPATIENT)
Dept: SURGERY | Facility: CLINIC | Age: 64
End: 2024-02-19

## 2024-02-19 DIAGNOSIS — Z98.1 S/P LUMBAR FUSION: Primary | ICD-10-CM

## 2024-02-19 NOTE — TELEPHONE ENCOUNTER
Patient is requesting to speak with clinical staff in regards to refill on Donaldson medication. Patient feels he was under prescribed. He then had to use medication he already had in his medicine cabinet.  Patient is requesting to have a quantity of 56 for Norco medication.     Patient is having abnormal pain in back an seen dry blood spotting on his shirt. Patient states has swelling     Patient also would like to review icing procedure.       Preferred pharmacy:[id number] [place] [id number] [address] [place] [facility] [address], [phone], [phone] [[id number]]

## 2024-02-19 NOTE — TELEPHONE ENCOUNTER
Noted the patient message listed below.    Noted the patient underwent Right L4-5 transforaminal lumbar interbody fusion on 2/14/2024      Called the patient to inquire further.   Pt states:  - his t-shirt is spotting by his incision site. Nothing smells. No other drainage. No redness.     - pt states there is swelling. Pt reports it is a V-Shaped swelling with the skin around it and makes it puffy.    - pt can still see    pt is inquiring if it was closed by sutures and dermabond     Pt states the Methocarbamol TID as Rxd - refill not requested at this time.    Pt is stating his norco 28 tablets was not enough medication to cover him for the pain. Pt is requesting to have at least enough to do the 2 per dose every 4 hours as needed for the next appointment.     Pt was taking left over norco from pain management if needed.      Pt states his lower back, quads and buttock muscles are sore. And is inquiring if the muscle aches. Pt is inquiring if his muscles are aching in that region because of the  readjustment in his spine from the surgery.  Pt states he is taking the muscle relaxer as prescribed and is still the feeling the muscle aching.     Pt is requesting nursing staff to send a [employer] message to attach pictures of the incision site.     Pt is appreciative  Call was ended.    Message sent to patient.     Routed to the ADRIANNA for review and feedback

## 2024-02-20 RX ORDER — HYDROCODONE BITARTRATE AND ACETAMINOPHEN 10; 325 MG/1; MG/1
1 TABLET ORAL EVERY 6 HOURS PRN
Qty: 28 TABLET | Refills: 0 | Status: SHIPPED | OUTPATIENT
Start: 2024-02-20 | End: 2024-02-22

## 2024-02-20 NOTE — TELEPHONE ENCOUNTER
The incisions look well at this point in time. Patient can cover incision with a clean dressing if he notices spotting on his shirt. Monitor for increasing/spreading redness, increase in discharge or warmth.     The incision was closed with absorbable sutures and covered with skin glue. We will plan to follow up at the two week postop prosper for incision check. Call if worsens.

## 2024-02-20 NOTE — TELEPHONE ENCOUNTER
Refilled Norco-10mg.     I, Jared Buckner PA-C, have reviewed the [place] Prescription Monitoring Program for dispensed medication history.

## 2024-02-22 ENCOUNTER — TELEPHONE (OUTPATIENT)
Dept: SURGERY | Facility: CLINIC | Age: 64
End: 2024-02-22

## 2024-02-22 NOTE — TELEPHONE ENCOUNTER
Again reviewed new photos of Mr. Villagomez incisions.  They look appropriate at this time.  Sent patient a message to continue monitoring incisions.  No intervention needed at this time.  I did call [facility] pharmacy to add additional pain medication prescription for Mr. Villagomez before the weekend.

## 2024-02-22 NOTE — TELEPHONE ENCOUNTER
Patient called asking to jeremías with EDISON Coleman /Sita, He is sending photos of incision via my chart and would like his input or if he needs to be seen sooner than 2/29/24/ He will also  need a refill on Norco Dosage was changed and he will not have enough for the weekend

## 2024-02-27 ENCOUNTER — OFFICE VISIT (OUTPATIENT)
Dept: SURGERY | Facility: CLINIC | Age: 64
End: 2024-02-27
Payer: COMMERCIAL

## 2024-02-27 VITALS
HEIGHT: 70 IN | SYSTOLIC BLOOD PRESSURE: 155 MMHG | BODY MASS INDEX: 26.63 KG/M2 | WEIGHT: 186 LBS | DIASTOLIC BLOOD PRESSURE: 93 MMHG | HEART RATE: 94 BPM

## 2024-02-27 DIAGNOSIS — Z98.1 S/P LUMBAR FUSION: Primary | ICD-10-CM

## 2024-02-27 PROCEDURE — 3008F BODY MASS INDEX DOCD: CPT

## 2024-02-27 PROCEDURE — 99024 POSTOP FOLLOW-UP VISIT: CPT

## 2024-02-27 PROCEDURE — 3080F DIAST BP >= 90 MM HG: CPT

## 2024-02-27 PROCEDURE — 3077F SYST BP >= 140 MM HG: CPT

## 2024-02-27 RX ORDER — METHYLPHENIDATE HYDROCHLORIDE 36 MG/1
36 TABLET, EXTENDED RELEASE ORAL
COMMUNITY
Start: 2023-01-28

## 2024-02-27 RX ORDER — BLOOD SUGAR DIAGNOSTIC
STRIP MISCELLANEOUS
COMMUNITY
Start: 2023-09-13

## 2024-02-27 RX ORDER — BLOOD SUGAR DIAGNOSTIC
1 STRIP MISCELLANEOUS 2 TIMES DAILY
COMMUNITY
Start: 2023-09-21

## 2024-02-27 NOTE — PROGRESS NOTES
Pt here for a post op     SX Right L4-5 transforaminal lumbar interbody fusion     Pain score 8/10     States he has new pain in GERARD leg pain

## 2024-02-27 NOTE — PATIENT INSTRUCTIONS
Refill policies:    Allow 2-3 business days for refills; controlled substances may take longer.  Contact your pharmacy at least 5 days prior to running out of medication and have them send an electronic request or submit request through the “request refill” option in your [employer] account.  Refills are not addressed on weekends; covering physicians do not authorize routine medications on weekends.  No narcotics or controlled substances are refilled after noon on Fridays or by on call physicians.  By law, narcotics must be electronically prescribed.  A 30 day supply with no refills is the maximum allowed.  If your prescription is due for a refill, you may be due for a follow up appointment.  To best provide you care, patients receiving routine medications need to be seen at least once a year.  Patients receiving narcotic/controlled substance medications need to be seen at least once every 3 months.  In the event that your preferred pharmacy does not have the requested medication in stock (e.g. Backordered), it is your responsibility to find another pharmacy that has the requested medication available.  We will gladly send a new prescription to that pharmacy at your request.    Scheduling Tests:    If your physician has ordered radiology tests such as MRI or CT scans, please contact Central Scheduling at [phone] right away to schedule the test.  Once scheduled, the [facility] Centralized Referral Team will work with your insurance carrier to obtain pre-certification or prior authorization.  Depending on your insurance carrier, approval may take 3-10 days.  It is highly recommended patients assure they have received an authorization before having a test performed.  If test is done without insurance authorization, patient may be responsible for the entire amount billed.      Precertification and Prior Authorizations:  If your physician has recommended that you have a procedure or additional testing performed the [facility]  Centralized Referral Team will contact your insurance carrier to obtain pre-certification or prior authorization.    You are strongly encouraged to contact your insurance carrier to verify that your procedure/test has been approved and is a COVERED benefit.  Although the [facility] Centralized Referral Team does its due diligence, the insurance carrier gives the disclaimer that \"Although the procedure is authorized, this does not guarantee payment.\"    Ultimately the patient is responsible for payment.   Thank you for your understanding in this matter.  Paperwork Completion:  If you require FMLA or disability paperwork for your recovery, please make sure to either drop it off or have it faxed to our office at [phone]. Be sure the form has your name and date of birth on it.  The form will be faxed to our Forms Department and they will complete it for you.  There is a 25$ fee for all forms that need to be filled out.  Please be aware there is a 10-14 day turnaround time.  You will need to sign a release of information (ALEXIS) form if your paperwork does not come with one.  You may call the Forms Department with any questions at [phone].  Their fax number is [phone].

## 2024-02-27 NOTE — PROGRESS NOTES
EL ANAYA M.D., F.A.A.N.S.     of Neurosurgery  [facility]  Board Certified Neurosurgeon                              [address]  [address] [place] [id number]    PHONE  [phone]          FAX  [phone]    [email or web address]      OFFICE FOLLOW-UP NOTE            Derian Villagomez    : 1960    MRN: [id number]  CSN: [id number]      PCP: Jose Alberto Javier MD  Referring Provider: No ref. provider found    Insurance: Payor: [facility] PPO / Plan: BCBS CHOICE POS / Product Type: POS /           Date of Visit: 2024    Reason for Visit:   Chief Complaint    Post-Op                         History of Present Care:  Derian Villagomez is a a(n) 63 year old, male.  2 weeks status post L4-5 TLIF with resolution of right lower extremity pain but now postoperative left lower extremity discomfort.  Some mild drainage from the right incision.  No focal weakness.      History:  .  Past Medical History:   Diagnosis Date    Attention deficit disorder     Back problem     Diabetes (HCC)     High blood pressure     Sleep apnea     has cpap- does not use      Past Surgical History:   Procedure Laterality Date    ARTHROSCOPY OF JOINT UNLISTED Left     HERNIA SURGERY      OTHER SURGICAL HISTORY  2024    Right L4-5 transforaminal lumbar interbody fusion      Family History   Problem Relation Age of Onset    Diabetes Father       Social History     Socioeconomic History    Marital status:      Spouse name: Not on file    Number of children: Not on file    Years of education: Not on file    Highest education level: Not on file   Occupational History    Not on file   Tobacco Use    Smoking status: Never    Smokeless tobacco: Never   Vaping Use    Vaping Use: Every day    Substances: THC, CBD   Substance and Sexual Activity    Alcohol use: Yes     Comment: very rare     Drug use: Yes     Types: Cannabis    Sexual activity: Not on file   Other Topics Concern    Not on file   Social History Narrative    Not on file     Social Determinants of Health     Financial Resource Strain: Not on file   Food Insecurity: No Food Insecurity (2/14/2024)    Food Insecurity     Food Insecurity: Never true   Transportation Needs: No Transportation Needs (2/14/2024)    Transportation Needs     Lack of Transportation: No   Physical Activity: Not on file   Stress: Not on file   Social Connections: Not on file   Housing Stability: Low Risk  (2/14/2024)    Housing Stability     Housing Instability: No     Housing Instability Emergency: Not on file        Allergies:  No Known Allergies      Medications:  Current Outpatient Medications   Medication Sig Dispense Refill    Glucose Blood (ONETOUCH VERIO) In Vitro Strip 1 each 2 (two) times daily.      Glucose Blood (ONETOUCH VERIO) In Vitro Strip Test 2 x day      Methylphenidate HCl ER 36 MG Oral Tablet 24 Hr Take 36 mg by mouth.      HYDROcodone-acetaminophen  MG Oral Tab Take 1-2 tablets by mouth every 6 (six) hours as needed for Pain. 56 tablet 0    aspirin 81 MG Oral Tab EC Take 1 tablet (81 mg total) by mouth daily. HOLD for 5 days post operatively      docusate sodium 100 MG Oral Cap Take 100 mg by mouth 2 (two) times daily.      polyethylene glycol, PEG 3350, 17 g Oral Powd Pack Take 17 g by mouth daily as needed (constipation).      amphetamine-dextroamphetamine 20 MG Oral Tab Take 1 tablet (20 mg total) by mouth daily.      metFORMIN 500 MG Oral Tab Take 1 tablet (500 mg total) by mouth 2 (two) times daily with meals.      gabapentin 600 MG Oral Tab Take 1 tablet (600 mg total) by mouth at bedtime.      lisinopril 5 MG Oral Tab Take 1 tablet (5 mg total) by mouth daily.      simvastatin 20 MG Oral Tab Take 1 tablet (20 mg total) by mouth nightly.      Dulaglutide (TRULICITY) 4.5 MG/0.5ML Subcutaneous Solution Pen-injector Inject 1 Dose into  the skin once a week. Patient instructed to not take any more trulicity before surgery      loratadine 10 MG Oral Tablet Dispersible Take 1 tablet (10 mg total) by mouth at bedtime.          Review of Systems:  A 10-point system was reviewed.  Pertinent positives and negatives are noted in HPI.      Physical Exam:  BP (!) 155/93 (BP Location: Right arm, Patient Position: Sitting, Cuff Size: adult)   Pulse 94   Ht 70\"   Wt 186 lb (84.4 kg)   BMI 26.69 kg/m²         Neurological Exam:    AAOx3, following commands  PERRLA  EOMI  Face symmetrical  Tongue midline  Hearing symmetrical and intact to finger rub    No rhinorrhea or otorrhea    Romberg negative    Motor Strength:  Symmetrical and intact in dorsiflexion and plantarflexion bilaterally.    Sensation to light touch:  Symmetrical to light touch    Incision:  Incisions appear benign.  There is some hardness lateral to the right incision subcutaneously consistent with postoperative hematoma.      Abdomen:  Soft, non-distended, non-tender, with no rebound or guarding.  No peritoneal signs.     Extremities:  Non-tender, no lower extremity edema noted.      Labs:  CBC:  No results found for: \"WBC\", \"HGB\", \"HEMOGLOBIN\", \"HCT\", \"MCV\", \"PLT\"   BMG:  No results found for: \"GLUF\", \"NA\", \"K\", \"CO2\", \"CL\", \"BUN\", \"CREATININE\"   INR:  No results found for: \"INR\", \"PROTIME\"       Diagnostics:  No new imaging to review    Diagnosis:  1. S/P lumbar fusion      Assessment/Plan:  Our patient is 2 weeks status post L4-5 TLIF.  I discussed with him the utilization of gabapentin which she will increase at this point in time to address the left lower extremity discomfort.  We will monitor that discomfort and hope that it will resolve with time and physical therapy.  We will also continue to monitor the incision.  At this point in time he will continue utilizing intermittent dressings to address any seroma drainage did make clear itself.  All questions and concerns were addressed.   Physical therapy will be ordered.    More than 30 minutes were spent during this visit and the coordination of this patient's care. All questions and concerns were addressed. We appreciate the opportunity to participate in the care of this patient. Please do not hesitate to call our office ([phone]) with any issues.    This note has been dictated utilizing voice recognition software. Unfortunately, this may lead to occasional typographical errors. If there are any questions regarding this, please do not hesitate to contact our office.             Francesco Lee M.D., F.A.A.N.S.    2/27/2024  11:45 AM

## 2024-03-01 ENCOUNTER — PATIENT MESSAGE (OUTPATIENT)
Facility: CLINIC | Age: 64
End: 2024-03-01

## 2024-03-01 DIAGNOSIS — L03.312 CELLULITIS OF BACK EXCEPT BUTTOCK: ICD-10-CM

## 2024-03-01 DIAGNOSIS — Z98.1 S/P LUMBAR FUSION: Primary | ICD-10-CM

## 2024-03-01 RX ORDER — CEPHALEXIN 500 MG/1
500 CAPSULE ORAL 4 TIMES DAILY
Qty: 40 CAPSULE | Refills: 0 | Status: SHIPPED | OUTPATIENT
Start: 2024-03-01 | End: 2024-03-11

## 2024-03-01 NOTE — TELEPHONE ENCOUNTER
Noted messages below.  Called pt to inform, acknowledged abt and instructions.  Relayed msg from provider.    Pt upset about norco and escripts situation, offered options, call PCP, visit urgent care, wait until Monday.    [place] msg sent to pt. With options and discussing our office refill policy.

## 2024-03-01 NOTE — TELEPHONE ENCOUNTER
Let's start keflex 500mg QID for 10 days. Please keep the incision site covered. Change dressing daily if possible.     Be patient with the sciatic pain. It typically flares up when patient's initiate physical therapy.

## 2024-03-01 NOTE — TELEPHONE ENCOUNTER
Patient is s/p Right L4-5 transforaminal lumbar interbody fusion performed on 2-14-24 by Dr Lee.  Please review images attached to  msg and advise.

## 2024-03-04 RX ORDER — HYDROCODONE BITARTRATE AND ACETAMINOPHEN 10; 325 MG/1; MG/1
1 TABLET ORAL EVERY 6 HOURS PRN
Qty: 28 TABLET | Refills: 0 | Status: SHIPPED | OUTPATIENT
Start: 2024-03-04 | End: 2024-03-11

## 2024-03-04 NOTE — TELEPHONE ENCOUNTER
Pt requesting refill on Norco    Medication: Norco 325-10mg     Date of last refill: 2.21.24 (#28/0)  [place] 2[place].24     Last office visit: 2.27.24  Due back to clinic per last office note:  not specified  Date next office visit scheduled:    Future Appointments   Date Time Provider Department Center   3/26/2024 10:30 AM Jared Buckner PA-C [facility] [place] [facility]           Last OV note recommendation:    \"Assessment/Plan:  Our patient is 2 weeks status post L4-5 TLIF.  I discussed with him the utilization of gabapentin which she will increase at this point in time to address the left lower extremity discomfort.  We will monitor that discomfort and hope that it will resolve with time and physical therapy.  We will also continue to monitor the incision.  At this point in time he will continue utilizing intermittent dressings to address any seroma drainage did make clear itself.  All questions and concerns were addressed.  Physical therapy will be ordered.\"

## 2024-03-04 NOTE — TELEPHONE ENCOUNTER
Refchris    IJared PA-C, have reviewed the [place] Prescription Monitoring Program for dispensed medication history.

## 2024-03-06 ENCOUNTER — TELEPHONE (OUTPATIENT)
Dept: SURGERY | Facility: CLINIC | Age: 64
End: 2024-03-06

## 2024-03-06 DIAGNOSIS — Z98.1 S/P LUMBAR FUSION: Primary | ICD-10-CM

## 2024-03-06 NOTE — TELEPHONE ENCOUNTER
Pt requesting refill on Norco . He wants to make sure it is called in b-4 the weekend, He is asking if you can order 56 tabs

## 2024-03-07 RX ORDER — HYDROCODONE BITARTRATE AND ACETAMINOPHEN 10; 325 MG/1; MG/1
1-2 TABLET ORAL EVERY 6 HOURS PRN
Qty: 56 TABLET | Refills: 0 | Status: SHIPPED | OUTPATIENT
Start: 2024-03-07 | End: 2024-03-14

## 2024-03-07 NOTE — TELEPHONE ENCOUNTER
Refilled pain medication for additional 7 days at Norco 10 mg, 2 tablets every 6 hours.    IJared PA-C, have reviewed the [place] Prescription Monitoring Program for dispensed medication history.

## 2024-03-18 DIAGNOSIS — Z98.1 S/P LUMBAR FUSION: Primary | ICD-10-CM

## 2024-03-18 NOTE — TELEPHONE ENCOUNTER
Patient in need of refill for Hydrocodone acetaminophen 10/325mg 1-2 tabs every 6 hours as needed for pain. Last refill 3/7/24 for quantity of 56 tab.  shows last fill is for 56 (7 day supply) on 3/8/24.    Patient last seen on 2/27/24 for 2 week post op from L4-5 TLIF.     Diagnosis:  1. S/P lumbar fusion        Assessment/Plan:  Our patient is 2 weeks status post L4-5 TLIF.  I discussed with him the utilization of gabapentin which she will increase at this point in time to address the left lower extremity discomfort.  We will monitor that discomfort and hope that it will resolve with time and physical therapy.  We will also continue to monitor the incision.  At this point in time he will continue utilizing intermittent dressings to address any seroma drainage did make clear itself.  All questions and concerns were addressed.  Physical therapy will be ordered.

## 2024-03-18 NOTE — TELEPHONE ENCOUNTER
Verified name and .  Needs a refill on Norco 10/325 mg  Quantity 56 , Only has enough for 1 more day.

## 2024-03-20 RX ORDER — HYDROCODONE BITARTRATE AND ACETAMINOPHEN 10; 325 MG/1; MG/1
TABLET ORAL
Qty: 40 TABLET | Refills: 0 | Status: SHIPPED | OUTPATIENT
Start: 2024-03-20 | End: 2024-03-27

## 2024-03-20 NOTE — TELEPHONE ENCOUNTER
Medication: Hydrocodone-acetaminophen 10-325mg  Date of last refill: 3.7.24  (#56 / 0)  [place] 3[place].24  Last office visit: 2.27.24  Due back to clinic per last office note:  3.26.24 (Upcoming appt)  Date next office visit scheduled:    Future Appointments   Date Time Provider Department Center   3/26/2024 10:30 AM Jared Buckner PA-C [facility]

## 2024-03-20 NOTE — TELEPHONE ENCOUNTER
Refilling Norco-10mg with tapered schedule. Will continue to decrease strength and frequency of medication in future refills.     IJared PA-C, have reviewed the [place] Prescription Monitoring Program for dispensed medication history.

## 2024-03-26 ENCOUNTER — OFFICE VISIT (OUTPATIENT)
Dept: SURGERY | Facility: CLINIC | Age: 64
End: 2024-03-26
Payer: COMMERCIAL

## 2024-03-26 VITALS
BODY MASS INDEX: 26.78 KG/M2 | SYSTOLIC BLOOD PRESSURE: 161 MMHG | WEIGHT: 185 LBS | HEIGHT: 69.75 IN | DIASTOLIC BLOOD PRESSURE: 91 MMHG | HEART RATE: 85 BPM

## 2024-03-26 DIAGNOSIS — Z98.1 S/P LUMBAR FUSION: Primary | ICD-10-CM

## 2024-03-26 PROCEDURE — 3077F SYST BP >= 140 MM HG: CPT

## 2024-03-26 PROCEDURE — 3008F BODY MASS INDEX DOCD: CPT

## 2024-03-26 PROCEDURE — 99024 POSTOP FOLLOW-UP VISIT: CPT

## 2024-03-26 PROCEDURE — 3080F DIAST BP >= 90 MM HG: CPT

## 2024-03-26 RX ORDER — HYDROCODONE BITARTRATE AND ACETAMINOPHEN 5; 325 MG/1; MG/1
1 TABLET ORAL EVERY 6 HOURS PRN
Qty: 28 TABLET | Refills: 0 | Status: SHIPPED | OUTPATIENT
Start: 2024-03-29 | End: 2024-04-05

## 2024-03-26 NOTE — PROGRESS NOTES
[facility]        Center for [facility]      [address]  [address]  [place] [address]    PHONE  [phone]          FAX  [phone]    [email or web address]      OFFICE FOLLOW-UP NOTE            Derian Villagomez    : 1960    MRN: [id number]  CSN: [id number]      PCP: Jose Alberto Javier MD  Referring Provider: No ref. provider found    Insurance: Payor: [facility] PPO / Plan: BCBS CHOICE POS / Product Type: POS /           Date of Visit: 3/26/2024    Reason for Visit:   Chief Complaint    Post-Op                         History of Present Care:  Derian Villagomez is a a(n) 63 year old, male 6 weeks status post L4-5 interbody fusion.  Patient is doing quite well postoperatively.  He has been working with physical therapy and making steady progress.  On the way into the office today he did have some left sharp stabbing in his gluteus due to long drive.  He does use approximately 4 Norco 10 mg and gabapentin.  He is beginning to titrate down on the pain medication.  He would like to begin working on his golf swing but will first complete a few more weeks of physical therapy prior to doing so.  He reports no incisional site tenderness, pain, discharge.      History:  Past Medical History:   Diagnosis Date    Attention deficit disorder     Back problem     Diabetes (HCC)     High blood pressure     Sleep apnea     has cpap- does not use      Past Surgical History:   Procedure Laterality Date    ARTHROSCOPY OF JOINT UNLISTED Left     HERNIA SURGERY      OTHER SURGICAL HISTORY  2024    Right L4-5 transforaminal lumbar interbody fusion      Family History   Problem Relation Age of Onset    Diabetes Father       Social History     Socioeconomic History    Marital status:      Spouse name: Not on file    Number of children: Not on file    Years of education: Not on file    Highest education level: Not on file    Occupational History    Not on file   Tobacco Use    Smoking status: Never    Smokeless tobacco: Never   Vaping Use    Vaping Use: Every day    Substances: THC, CBD   Substance and Sexual Activity    Alcohol use: Yes     Comment: very rare    Drug use: Yes     Types: Cannabis    Sexual activity: Not on file   Other Topics Concern    Not on file   Social History Narrative    Not on file     Social Determinants of Health     Financial Resource Strain: Not on file   Food Insecurity: No Food Insecurity (2/14/2024)    Food Insecurity     Food Insecurity: Never true   Transportation Needs: No Transportation Needs (2/14/2024)    Transportation Needs     Lack of Transportation: No   Physical Activity: Not on file   Stress: Not on file   Social Connections: Not on file   Housing Stability: Low Risk  (2/14/2024)    Housing Stability     Housing Instability: No     Housing Instability Emergency: Not on file        Allergies:  No Known Allergies      Medications:  Current Outpatient Medications   Medication Sig Dispense Refill    [START ON 3/29/2024] HYDROcodone-acetaminophen 5-325 MG Oral Tab Take 1 tablet by mouth every 6 (six) hours as needed for Pain. 28 tablet 0    HYDROcodone-acetaminophen  MG Oral Tab Take 1-2 tablets by mouth every 6 (six) hours as needed for Pain for 3 days, THEN 1 tablet every 6 (six) hours as needed for Pain. 40 tablet 0    Glucose Blood (ONETOUCH VERIO) In Vitro Strip 1 each 2 (two) times daily.      Glucose Blood (ONETOUCH VERIO) In Vitro Strip Test 2 x day      Methylphenidate HCl ER 36 MG Oral Tablet 24 Hr Take 36 mg by mouth.      aspirin 81 MG Oral Tab EC Take 1 tablet (81 mg total) by mouth daily. HOLD for 5 days post operatively      docusate sodium 100 MG Oral Cap Take 100 mg by mouth 2 (two) times daily.      amphetamine-dextroamphetamine 20 MG Oral Tab Take 1 tablet (20 mg total) by mouth daily.      metFORMIN 500 MG Oral Tab Take 1 tablet (500 mg total) by mouth 2 (two) times  daily with meals.      gabapentin 600 MG Oral Tab Take 1 tablet (600 mg total) by mouth at bedtime.      lisinopril 5 MG Oral Tab Take 1 tablet (5 mg total) by mouth daily.      simvastatin 20 MG Oral Tab Take 1 tablet (20 mg total) by mouth nightly.      Dulaglutide (TRULICITY) 4.5 MG/0.5ML Subcutaneous Solution Pen-injector Inject 1 Dose into the skin once a week. Patient instructed to not take any more trulicity before surgery      loratadine 10 MG Oral Tablet Dispersible Take 1 tablet (10 mg total) by mouth at bedtime.          Review of Systems:  A 10-point system was reviewed.  Pertinent positives and negatives are noted in HPI.      Physical Exam:  BP (!) 161/91 (BP Location: Right arm, Patient Position: Sitting, Cuff Size: adult)   Pulse 85   Ht 69.75\"   Wt 185 lb (83.9 kg)   BMI 26.74 kg/m²         Neurological Exam:    Motor Strength:  5 out of 5 in bilateral lower extremities throughout    Sensation to light touch:  Intact in bilateral lower extremities    Incision:  Clean, dry, intact      Abdomen:  Soft, non-distended, non-tender, with no rebound or guarding.  No peritoneal signs.     Extremities:  Non-tender, no lower extremity edema noted.      Labs:  CBC:  No results found for: \"WBC\", \"HGB\", \"HEMOGLOBIN\", \"HCT\", \"MCV\", \"PLT\"   BMG:  No results found for: \"GLUF\", \"NA\", \"K\", \"CO2\", \"CL\", \"BUN\", \"CREATININE\"   INR:  No results found for: \"INR\", \"PROTIME\"       Diagnostics:  None to review    Diagnosis:  1. S/P lumbar fusion  - HYDROcodone-acetaminophen 5-325 MG Oral Tab; Take 1 tablet by mouth every 6 (six) hours as needed for Pain.  Dispense: 28 tablet; Refill: 0  - XR LUMBAR SPINE (MIN 2 VIEWS) (CPT=72100); Future  - PHYSICAL THERAPY EXTERNAL      Assessment/Plan:  Derian MANISHA Villagomez presents 6 weeks status post L4-5 interbody fusion.  He is making steady progress in regards to his postoperative recovery.  I have provided him with an additional order for physical therapy as well as an order for an  x-ray lumbar spine to be completed 6 weeks from now.  Patient reports improvement in his lower extremity radiating pain and tingling.  He has began reducing the frequency of his pain medication and we will move toward decreasing the strength as well.  I have provided him with a prescription for Norco 5 mg every 6 hours.  Will plan to see patient back in the office 6 weeks from today with an x-ray of the lumbar spine completed.    More than 30 minutes were spent during this visit and the coordination of this patient's care. All questions and concerns were addressed. We appreciate the opportunity to participate in the care of this patient. Please do not hesitate to call our office ([phone]) with any issues.    This note has been dictated utilizing voice recognition software. Unfortunately, this may lead to occasional typographical errors. If there are any questions regarding this, please do not hesitate to contact our office.           Jared Buckner PA-C    3/26/2024  11:21 AM

## 2024-03-28 ENCOUNTER — TELEPHONE (OUTPATIENT)
Dept: SURGERY | Facility: CLINIC | Age: 64
End: 2024-03-28

## 2024-03-28 NOTE — TELEPHONE ENCOUNTER
[place] office received Initial Evaluation report from [facility] Physical therapy.     Date of Service: 02/28/2024    Jared Buckner PA-C, reviewed and signed report.   Signed report successfully faxed to [facility] at [phone].  Signed report to scanning department.

## 2024-05-04 ENCOUNTER — HOSPITAL ENCOUNTER (OUTPATIENT)
Dept: GENERAL RADIOLOGY | Facility: HOSPITAL | Age: 64
Discharge: HOME | End: 2024-05-04
Payer: COMMERCIAL

## 2024-05-04 DIAGNOSIS — Z98.1 S/P LUMBAR FUSION: ICD-10-CM

## 2024-05-04 PROCEDURE — 72100 X-RAY EXAM L-S SPINE 2/3 VWS: CPT

## 2024-05-07 ENCOUNTER — OFFICE VISIT (OUTPATIENT)
Dept: SURGERY | Facility: CLINIC | Age: 64
End: 2024-05-07
Payer: COMMERCIAL

## 2024-05-07 VITALS
DIASTOLIC BLOOD PRESSURE: 102 MMHG | HEIGHT: 69 IN | SYSTOLIC BLOOD PRESSURE: 171 MMHG | BODY MASS INDEX: 27.4 KG/M2 | HEART RATE: 88 BPM | WEIGHT: 185 LBS

## 2024-05-07 DIAGNOSIS — Z98.1 S/P LUMBAR FUSION: Primary | ICD-10-CM

## 2024-05-07 PROCEDURE — 3077F SYST BP >= 140 MM HG: CPT | Performed by: NEUROLOGICAL SURGERY

## 2024-05-07 PROCEDURE — 3008F BODY MASS INDEX DOCD: CPT | Performed by: NEUROLOGICAL SURGERY

## 2024-05-07 PROCEDURE — 3080F DIAST BP >= 90 MM HG: CPT | Performed by: NEUROLOGICAL SURGERY

## 2024-05-07 PROCEDURE — 99214 OFFICE O/P EST MOD 30 MIN: CPT | Performed by: NEUROLOGICAL SURGERY

## 2024-05-07 RX ORDER — POLYETHYLENE GLYCOL 3350 17 G/17G
POWDER, FOR SOLUTION ORAL AS DIRECTED
COMMUNITY
Start: 2024-02-15

## 2024-05-07 RX ORDER — TAMSULOSIN HYDROCHLORIDE 0.4 MG/1
0.4 CAPSULE ORAL
COMMUNITY

## 2024-05-07 RX ORDER — HYDROCODONE BITARTRATE AND ACETAMINOPHEN 10; 325 MG/1; MG/1
TABLET ORAL
COMMUNITY
Start: 2024-03-20

## 2024-05-07 RX ORDER — CEPHALEXIN 500 MG/1
CAPSULE ORAL
COMMUNITY
Start: 2024-03-01

## 2024-05-07 RX ORDER — METHOCARBAMOL 750 MG/1
TABLET, FILM COATED ORAL
COMMUNITY
Start: 2024-02-14

## 2024-05-07 NOTE — PROGRESS NOTES
EL ANAYA M.D., F.A.A.N.S.     of Neurosurgery  [facility]  Board Certified Neurosurgeon                          [facility], [facility], [facility] Neurosurgery        [place] [facility] [address]  [address]  [place] [address]    PHONE  [phone]          FAX  [phone]    [email or web address]      OFFICE FOLLOW-UP NOTE      Derian Villagomez    : 1960    MRN: [id number]  CSN: [id number]      PCP: Jose Alberto Javier MD  Referring Provider: No ref. provider found    Insurance: Payor: [facility] PPO / Plan: BCBS CHOICE POS / Product Type: POS /     Date of Visit: 2024    Reason for Visit:   Chief Complaint    Follow - Up                         History of Present Care:  Derian Villagomez is a a(n) 63 year old, male.  Our patient is 3 months status post L4-5 interbody fusion.  He is doing very well with resolution of his preoperative symptoms.  He continues to have some numbness in his lower extremities but it has been significantly improving.  He denies any lower extremity pain.  He is here with his 3 months postop x-ray.      History:  .  Past Medical History:    Attention deficit disorder    Back problem    Diabetes (HCC)    High blood pressure    Sleep apnea    has cpap- does not use      Past Surgical History:   Procedure Laterality Date    Arthroscopy of joint unlisted Left     Hernia surgery      Other surgical history  2024    Right L4-5 transforaminal lumbar interbody fusion      Family History   Problem Relation Age of Onset    Diabetes Father       Social History     Socioeconomic History    Marital status:      Spouse name: Not on file    Number of children: Not on file    Years of education: Not on file    Highest education level: Not on file   Occupational History    Not on file   Tobacco Use    Smoking status: Never    Smokeless tobacco:  Never   Vaping Use    Vaping status: Every Day    Substances: THC, CBD   Substance and Sexual Activity    Alcohol use: Yes     Comment: very rare    Drug use: Yes     Types: Cannabis    Sexual activity: Not on file   Other Topics Concern    Not on file   Social History Narrative    Not on file     Social Determinants of Health     Financial Resource Strain: Low Risk  (2/15/2023)    [facility]    Overall Financial Resource Strain (CARDIA)     Difficulty of Paying Living Expenses: Not very hard   Food Insecurity: No Food Insecurity (2/14/2024)    Food Insecurity     Food Insecurity: Never true   Transportation Needs: No Transportation Needs (2/14/2024)    Transportation Needs     Lack of Transportation: No   Physical Activity: Not on file   Stress: Not on file   Social Connections: Not on file   Housing Stability: Low Risk  (2/14/2024)    Housing Stability     Housing Instability: No     Housing Instability Emergency: Not on file     Crib or Bassinette: Not on file        Allergies:  No Known Allergies      Medications:  Current Outpatient Medications   Medication Sig Dispense Refill    cephalexin 500 MG Oral Cap       HYDROcodone-acetaminophen  MG Oral Tab TAKE 1 TO 2 TABLETS BY MOUTH EVERY 6 HOURS FOR 3 DAYS AS NEEDED THEN TAKE 1 TABLET BY MOUTH EVERY 6 HOURS AS NEEDED FOR PAIN      methocarbamol 750 MG Oral Tab       Polyethylene Glycol 3350 17 g Oral Powd Pack Take by mouth As Directed.      tamsulosin 0.4 MG Oral Cap Take 1 capsule (0.4 mg total) by mouth daily with dinner.      Glucose Blood (ONETOUCH VERIO) In Vitro Strip 1 each 2 (two) times daily.      Glucose Blood (ONETOUCH VERIO) In Vitro Strip Test 2 x day      Methylphenidate HCl ER 36 MG Oral Tablet 24 Hr Take 36 mg by mouth.      aspirin 81 MG Oral Tab EC Take 1 tablet (81 mg total) by mouth daily. HOLD for 5 days post operatively      docusate sodium 100 MG Oral Cap Take  100 mg by mouth 2 (two) times daily.      amphetamine-dextroamphetamine 20 MG Oral Tab Take 1 tablet (20 mg total) by mouth daily.      metFORMIN 500 MG Oral Tab Take 1 tablet (500 mg total) by mouth 2 (two) times daily with meals.      gabapentin 600 MG Oral Tab Take 1 tablet (600 mg total) by mouth at bedtime.      lisinopril 5 MG Oral Tab Take 1 tablet (5 mg total) by mouth daily.      simvastatin 20 MG Oral Tab Take 1 tablet (20 mg total) by mouth nightly.      Dulaglutide (TRULICITY) 4.5 MG/0.5ML Subcutaneous Solution Pen-injector Inject 1 Dose into the skin once a week. Patient instructed to not take any more trulicity before surgery      loratadine 10 MG Oral Tablet Dispersible Take 1 tablet (10 mg total) by mouth at bedtime.          Review of Systems:  A 10-point system was reviewed.  Pertinent positives and negatives are noted in HPI.      Physical Exam:  BP (!) 171/102 (BP Location: Left arm, Patient Position: Sitting, Cuff Size: adult)   Pulse 88   Ht 69\"   Wt 185 lb (83.9 kg)   BMI 27.32 kg/m²         Neurological Exam:    AAOx3, following commands  PERRLA  EOMI  Face symmetrical  Tongue midline  Hearing symmetrical and intact to finger rub    No rhinorrhea or otorrhea    Romberg negative    Motor Strength:  Strength is intact in the lower extremities    Sensation to light touch:  Intact and symmetrical in the lower extremities    Incision:  clean dry and intact    Abdomen:  Soft, non-distended, non-tender, with no rebound or guarding.  No peritoneal signs.     Extremities:  Non-tender, no lower extremity edema noted.      Labs:  CBC:  No results found for: \"WBC\", \"HGB\", \"HEMOGLOBIN\", \"HCT\", \"MCV\", \"PLT\"   BMG:  No results found for: \"GLUF\", \"NA\", \"K\", \"CO2\", \"CL\", \"BUN\", \"CREATININE\"   INR:  No results found for: \"INR\", \"PROTIME\"       Diagnostics:  I reviewed an x-ray of the lumbar spine.  There is evidence of L4-5 interbody fusion.  There is no evidence of hardware  complications.    Diagnosis:  1. S/P lumbar fusion      Assessment/Plan:  Our patient is doing very well at this point in time.  We will continue his physical therapy.  His x-rays satisfactory and I would like a repeat x-ray at the 6-month postop prosper.  He is released from restrictions and may also start utilizing nonsteroidal anti-inflammatories at this point in time.    More than 30 minutes were spent during this visit and the coordination of this patient's care. All questions and concerns were addressed. We appreciate the opportunity to participate in the care of this patient. Please do not hesitate to call our office ([phone]) with any issues.        Francesco Lee M.D., F.A.A.N.S.    5/7/2024  11:48 AM    This note has been dictated utilizing voice recognition software. Unfortunately, this may lead to occasional typographical errors. If there are any questions regarding this, please do not hesitate to contact our office.

## 2024-08-05 ENCOUNTER — HOSPITAL ENCOUNTER (OUTPATIENT)
Dept: GENERAL RADIOLOGY | Facility: HOSPITAL | Age: 64
Discharge: HOME | End: 2024-08-05
Attending: NEUROLOGICAL SURGERY
Payer: COMMERCIAL

## 2024-08-05 DIAGNOSIS — Z98.1 S/P LUMBAR FUSION: ICD-10-CM

## 2024-08-05 PROCEDURE — 72100 X-RAY EXAM L-S SPINE 2/3 VWS: CPT | Performed by: NEUROLOGICAL SURGERY

## 2024-08-06 ENCOUNTER — OFFICE VISIT (OUTPATIENT)
Dept: SURGERY | Facility: CLINIC | Age: 64
End: 2024-08-06
Payer: COMMERCIAL

## 2024-08-06 VITALS
HEART RATE: 77 BPM | WEIGHT: 187 LBS | DIASTOLIC BLOOD PRESSURE: 92 MMHG | HEIGHT: 70 IN | BODY MASS INDEX: 26.77 KG/M2 | SYSTOLIC BLOOD PRESSURE: 154 MMHG

## 2024-08-06 DIAGNOSIS — Z98.1 S/P LUMBAR FUSION: Primary | ICD-10-CM

## 2024-08-06 PROCEDURE — 3077F SYST BP >= 140 MM HG: CPT | Performed by: NEUROLOGICAL SURGERY

## 2024-08-06 PROCEDURE — 3008F BODY MASS INDEX DOCD: CPT | Performed by: NEUROLOGICAL SURGERY

## 2024-08-06 PROCEDURE — 3080F DIAST BP >= 90 MM HG: CPT | Performed by: NEUROLOGICAL SURGERY

## 2024-08-06 PROCEDURE — 99214 OFFICE O/P EST MOD 30 MIN: CPT | Performed by: NEUROLOGICAL SURGERY

## 2024-08-06 NOTE — PATIENT INSTRUCTIONS
Refill policies:    Allow 2-3 business days for refills; controlled substances may take longer.  Contact your pharmacy at least 5 days prior to running out of medication and have them send an electronic request or submit request through the “request refill” option in your [employer] account.  Refills are not addressed on weekends; covering physicians do not authorize routine medications on weekends.  No narcotics or controlled substances are refilled after noon on Fridays or by on call physicians.  By law, narcotics must be electronically prescribed.  A 30 day supply with no refills is the maximum allowed.  If your prescription is due for a refill, you may be due for a follow up appointment.  To best provide you care, patients receiving routine medications need to be seen at least once a year.  Patients receiving narcotic/controlled substance medications need to be seen at least once every 3 months.  In the event that your preferred pharmacy does not have the requested medication in stock (e.g. Backordered), it is your responsibility to find another pharmacy that has the requested medication available.  We will gladly send a new prescription to that pharmacy at your request.    Scheduling Tests:    If your physician has ordered radiology tests such as MRI or CT scans, please contact Central Scheduling at [phone] right away to schedule the test.  Once scheduled, the [facility] Centralized Referral Team will work with your insurance carrier to obtain pre-certification or prior authorization.  Depending on your insurance carrier, approval may take 3-10 days.  It is highly recommended patients assure they have received an authorization before having a test performed.  If test is done without insurance authorization, patient may be responsible for the entire amount billed.      Precertification and Prior Authorizations:  If your physician has recommended that you have a procedure or additional testing performed the [facility]  Centralized Referral Team will contact your insurance carrier to obtain pre-certification or prior authorization.    You are strongly encouraged to contact your insurance carrier to verify that your procedure/test has been approved and is a COVERED benefit.  Although the [facility] Centralized Referral Team does its due diligence, the insurance carrier gives the disclaimer that \"Although the procedure is authorized, this does not guarantee payment.\"    Ultimately the patient is responsible for payment.   Thank you for your understanding in this matter.  Paperwork Completion:  If you require FMLA or disability paperwork for your recovery, please make sure to either drop it off or have it faxed to our office at [phone]. Be sure the form has your name and date of birth on it.  The form will be faxed to our Forms Department and they will complete it for you.  There is a 25$ fee for all forms that need to be filled out.  Please be aware there is a 10-14 day turnaround time.  You will need to sign a release of information (ALEXIS) form if your paperwork does not come with one.  You may call the Forms Department with any questions at [phone].  Their fax number is [phone].

## 2024-08-06 NOTE — PROGRESS NOTES
[facility]        [facility]      [address]  [address]  [place] [address]    PHONE  [phone]          FAX  [phone]    [email or web address]      OFFICE FOLLOW-UP NOTE            Derian Villagomez    : 1960    MRN: [id number]  CSN: [id number]      PCP: Jose Alberto Javier MD  Referring Provider: No ref. provider found    Insurance: Payor: [facility] PPO / Plan: BCBS CHOICE POS / Product Type: POS /           Date of Visit: 2024    Reason for Visit:   Chief Complaint    Follow - Up; Low Back Pain                         History of Present Care:  Derian Villagomez is a a(n) 63 year old, male returns to the office 6 months status post L4-5 interbody fusion.  Patient doing quite well at this time denies lower extremity pain.  He does have similar right plantar aspect numbness which is similar to before surgery.  This has not improved the way we had hoped.  It may reflect peripheral neuropathy, possibly related to his diabetes.  Overall his low back is doing well from a pain perspective and he has began golfing without difficulty.  He denies bowel or bladder changes.      History:  .  Past Medical History:    Attention deficit disorder    Back problem    Diabetes (HCC)    High blood pressure    Sleep apnea    has cpap- does not use      Past Surgical History:   Procedure Laterality Date    Arthroscopy of joint unlisted Left     Hernia surgery      Other surgical history  2024    Right L4-5 transforaminal lumbar interbody fusion      Family History   Problem Relation Age of Onset    Diabetes Father       Social History     Socioeconomic History    Marital status:      Spouse name: Not on file    Number of children: Not on file    Years of education: Not on file    Highest education level: Not on file   Occupational History    Not on file   Tobacco Use    Smoking status: Never    Smokeless tobacco:  Never   Vaping Use    Vaping status: Every Day    Substances: THC, CBD   Substance and Sexual Activity    Alcohol use: Yes     Comment: very rare    Drug use: Yes     Types: Cannabis    Sexual activity: Not on file   Other Topics Concern    Not on file   Social History Narrative    Not on file     Social Determinants of Health     Financial Resource Strain: Medium Risk (7/17/2024)    Received from [facility]    Overall Financial Resource Strain (CARDIA)     Difficulty of Paying Living Expenses: Somewhat hard   Food Insecurity: No Food Insecurity (7/17/2024)    Received from [facility]    Hunger Vital Sign     Worried About Running Out of Food in the Last Year: Never true     Ran Out of Food in the Last Year: Never true   Transportation Needs: No Transportation Needs (7/17/2024)    Received from [facility]    PRAPARE - Transportation     Lack of Transportation (Medical): No     Lack of Transportation (Non-Medical): No   Physical Activity: Not on file   Stress: Not on file   Social Connections: Not on file   Housing Stability: Low Risk  (7/17/2024)    Received from [facility]    Housing Stability Vital Sign     Unable to Pay for Housing in the Last Year: No     Number of Times Moved in the Last Year: 0     Homeless in the Last Year: No        Allergies:  No Known Allergies      Medications:  Current Outpatient Medications   Medication Sig Dispense Refill    cephalexin 500 MG Oral Cap       methocarbamol 750 MG Oral Tab       tamsulosin 0.4 MG Oral Cap Take 1 capsule (0.4 mg total) by mouth daily with dinner.      Glucose Blood (ONETOUCH VERIO) In Vitro Strip 1 each 2 (two) times daily.      Glucose Blood (ONETOUCH VERIO) In Vitro Strip Test 2 x day      Methylphenidate HCl ER 36 MG Oral Tablet 24 Hr Take 36 mg by mouth.      aspirin 81 MG Oral Tab EC Take 1 tablet (81 mg total) by mouth daily. HOLD for 5 days post  operatively      docusate sodium 100 MG Oral Cap Take 100 mg by mouth 2 (two) times daily.      amphetamine-dextroamphetamine 20 MG Oral Tab Take 1 tablet (20 mg total) by mouth daily.      metFORMIN 500 MG Oral Tab Take 1 tablet (500 mg total) by mouth 2 (two) times daily with meals.      gabapentin 600 MG Oral Tab Take 1 tablet (600 mg total) by mouth at bedtime.      lisinopril 5 MG Oral Tab Take 1 tablet (5 mg total) by mouth daily.      simvastatin 20 MG Oral Tab Take 1 tablet (20 mg total) by mouth nightly.      Dulaglutide (TRULICITY) 4.5 MG/0.5ML Subcutaneous Solution Pen-injector Inject 1 Dose into the skin once a week. Patient instructed to not take any more trulicity before surgery      loratadine 10 MG Oral Tablet Dispersible Take 1 tablet (10 mg total) by mouth at bedtime.      HYDROcodone-acetaminophen  MG Oral Tab TAKE 1 TO 2 TABLETS BY MOUTH EVERY 6 HOURS FOR 3 DAYS AS NEEDED THEN TAKE 1 TABLET BY MOUTH EVERY 6 HOURS AS NEEDED FOR PAIN (Patient not taking: Reported on 8/6/2024)      Polyethylene Glycol 3350 17 g Oral Powd Pack Take by mouth As Directed. (Patient not taking: Reported on 8/6/2024)          Review of Systems:  A 10-point system was reviewed.  Pertinent positives and negatives are noted in HPI.      Physical Exam:  BP (!) 154/92 (BP Location: Right arm, Patient Position: Sitting, Cuff Size: adult)   Pulse 77   Ht 70\"   Wt 187 lb (84.8 kg)   BMI 26.83 kg/m²         Neurological Exam:    AAOx3, following commands  PERRLA  EOMI  Face symmetrical  Tongue midline  Hearing symmetrical and intact to finger rub    No rhinorrhea or otorrhea    Romberg negative    Motor Strength:  5 out of 5 in bilateral lower extremities    Sensation to light touch:  Numbness to the right lower extremity plantar foot    Incision:  Clean, dry, intact      Abdomen:  Soft, non-distended, non-tender, with no rebound or guarding.  No peritoneal signs.     Extremities:  Non-tender, no lower extremity edema  noted.      Labs:  CBC:  No results found for: \"WBC\", \"HGB\", \"HEMOGLOBIN\", \"HCT\", \"MCV\", \"PLT\"   BMG:  No results found for: \"GLUF\", \"NA\", \"K\", \"CO2\", \"CL\", \"BUN\", \"CREATININE\"   INR:  No results found for: \"INR\", \"PROTIME\"       Diagnostics:  X-ray lumbar spine dated 8/5/2024 reviewed:  There is evidence of L4-5 interbody fusion with transpedicular screws.  There is degenerative disc disease present at L5-S1. There is no apparent complication with hardware.    Diagnosis:  1. S/P lumbar fusion      Assessment/Plan:  Derian Villagomez returns office 6 months status post L4-5 interbody fusion.  Patient is doing well at this point in time.  He continues to have right lower extremity plantar aspect numbness which likely represents a peripheral neuropathy related to his diabetes.  He is also found to have degenerative disc disease at L5-S1 although he is neurologically intact on exam from a motor perspective and without radicular symptoms in the right lower extremity.  I have advised him to continue staying active and he has initiated golfing without difficulty.  I provided her with an order for an additional x-ray to be completed in 6 months and I will see him back in the office at that time for review.    More than 30 minutes were spent during this visit and the coordination of this patient's care. All questions and concerns were addressed. We appreciate the opportunity to participate in the care of this patient. Please do not hesitate to call our office ([phone]) with any issues.    This note has been dictated utilizing voice recognition software. Unfortunately, this may lead to occasional typographical errors. If there are any questions regarding this, please do not hesitate to contact our office.           Jared Buckner PA-C    8/6/2024  12:08 PM

## 2024-08-06 NOTE — PROGRESS NOTES
Pt here for a 6 month follow up on his lower back     Pt to review imaging     Imaging in chart

## 2025-02-18 ENCOUNTER — TELEPHONE (OUTPATIENT)
Dept: NEUROLOGY | Facility: CLINIC | Age: 65
End: 2025-02-18

## 2025-02-18 ENCOUNTER — OFFICE VISIT (OUTPATIENT)
Dept: SURGERY | Facility: CLINIC | Age: 65
End: 2025-02-18
Payer: COMMERCIAL

## 2025-02-18 ENCOUNTER — HOSPITAL ENCOUNTER (OUTPATIENT)
Dept: GENERAL RADIOLOGY | Facility: HOSPITAL | Age: 65
Discharge: HOME | End: 2025-02-18
Payer: COMMERCIAL

## 2025-02-18 VITALS
BODY MASS INDEX: 25.48 KG/M2 | HEIGHT: 70 IN | SYSTOLIC BLOOD PRESSURE: 161 MMHG | DIASTOLIC BLOOD PRESSURE: 90 MMHG | OXYGEN SATURATION: 98 % | WEIGHT: 178 LBS | HEART RATE: 87 BPM

## 2025-02-18 DIAGNOSIS — Z98.1 S/P LUMBAR FUSION: ICD-10-CM

## 2025-02-18 DIAGNOSIS — Z98.1 S/P LUMBAR FUSION: Primary | ICD-10-CM

## 2025-02-18 PROCEDURE — 3008F BODY MASS INDEX DOCD: CPT | Performed by: NEUROLOGICAL SURGERY

## 2025-02-18 PROCEDURE — 99214 OFFICE O/P EST MOD 30 MIN: CPT | Performed by: NEUROLOGICAL SURGERY

## 2025-02-18 PROCEDURE — 72100 X-RAY EXAM L-S SPINE 2/3 VWS: CPT

## 2025-02-18 PROCEDURE — 3077F SYST BP >= 140 MM HG: CPT | Performed by: NEUROLOGICAL SURGERY

## 2025-02-18 PROCEDURE — 3080F DIAST BP >= 90 MM HG: CPT | Performed by: NEUROLOGICAL SURGERY

## 2025-02-18 RX ORDER — TIRZEPATIDE 5 MG/.5ML
INJECTION, SOLUTION SUBCUTANEOUS
COMMUNITY
Start: 2025-01-17

## 2025-02-18 RX ORDER — AMOXICILLIN 500 MG/1
500 TABLET, FILM COATED ORAL 2 TIMES DAILY
COMMUNITY
Start: 2025-01-20

## 2025-02-18 RX ORDER — GABAPENTIN 800 MG/1
1600 TABLET ORAL NIGHTLY
COMMUNITY
Start: 2025-02-09

## 2025-02-18 NOTE — PATIENT INSTRUCTIONS
Refill policies:    Allow 2-3 business days for refills; controlled substances may take longer.  Contact your pharmacy at least 5 days prior to running out of medication and have them send an electronic request or submit request through the “request refill” option in your [employer] account.  Refills are not addressed on weekends; covering physicians do not authorize routine medications on weekends.  No narcotics or controlled substances are refilled after noon on Fridays or by on call physicians.  By law, narcotics must be electronically prescribed.  A 30 day supply with no refills is the maximum allowed.  If your prescription is due for a refill, you may be due for a follow up appointment.  To best provide you care, patients receiving routine medications need to be seen at least once a year.  Patients receiving narcotic/controlled substance medications need to be seen at least once every 3 months.  In the event that your preferred pharmacy does not have the requested medication in stock (e.g. Backordered), it is your responsibility to find another pharmacy that has the requested medication available.  We will gladly send a new prescription to that pharmacy at your request.    Scheduling Tests:    If your physician has ordered radiology tests such as MRI or CT scans, please contact Central Scheduling at [phone] right away to schedule the test.  Once scheduled, the [facility] Centralized Referral Team will work with your insurance carrier to obtain pre-certification or prior authorization.  Depending on your insurance carrier, approval may take 3-10 days.  It is highly recommended patients assure they have received an authorization before having a test performed.  If test is done without insurance authorization, patient may be responsible for the entire amount billed.      Precertification and Prior Authorizations:  If your physician has recommended that you have a procedure or additional testing performed the [facility]  Centralized Referral Team will contact your insurance carrier to obtain pre-certification or prior authorization.    You are strongly encouraged to contact your insurance carrier to verify that your procedure/test has been approved and is a COVERED benefit.  Although the [facility] Centralized Referral Team does its due diligence, the insurance carrier gives the disclaimer that \"Although the procedure is authorized, this does not guarantee payment.\"    Ultimately the patient is responsible for payment.   Thank you for your understanding in this matter.  Paperwork Completion:  If you require FMLA or disability paperwork for your recovery, please make sure to either drop it off or have it faxed to our office at [phone]. Be sure the form has your name and date of birth on it.  The form will be faxed to our Forms Department and they will complete it for you.  There is a 25$ fee for all forms that need to be filled out.  Please be aware there is a 10-14 day turnaround time.  You will need to sign a release of information (ALEXIS) form if your paperwork does not come with one.  You may call the Forms Department with any questions at [phone].  Their fax number is [phone].

## 2025-02-18 NOTE — PROGRESS NOTES
Established patient:  Reason for follow up: 1 yr follow up post L4-5 interbody fusion. (Neck/Back/Head)    Numeric Rating Scale: (No Pain) 0  to  10 (Worst Pain)        Pain at Present:  1/10           Most recent treatments for Current Pain Condition:   Surgery  Response to treatment: complete resolution    New imaging or testing since your last office visit:        XR lumbar this morning

## 2025-02-18 NOTE — PROGRESS NOTES
EL ANAYA M.D., F.A.A.N.S.     of Neurosurgery  [facility]  Board Certified Neurosurgeon                          [facility], [facility], [facility] Neurosurgery        [place] [address]  [address]  [place] [address]    PHONE  [phone]          FAX  [phone]    [email or web address]      OFFICE FOLLOW-UP NOTE      Derian Villagomez    : 1960    MRN: [id number]  CSN: [id number]      PCP: Jose Alberto Javier MD  Referring Provider: No ref. provider found    Insurance: Pay[place] [employer] [place] / Plan: BC[employer] CHOICE POS / Product Type: POS /     Date of Visit: 2025    Reason for Visit:  1 year postop visit                   History of Present Care:  Derian Villagomez is a a(n) 64 year old, male.  Our patient is 1 year status post L4-5 interbody fusion.  He is doing very well and reports essentially 95% improvement relative to before surgery.  He is very happy with the outcome.  He has completed his 1 year postop x-ray.      History:  .  Past Medical History:    Attention deficit disorder    Back problem    Diabetes (HCC)    High blood pressure    Sleep apnea    has cpap- does not use      Past Surgical History:   Procedure Laterality Date    Arthroscopy of joint unlisted Left     Hernia surgery      Other surgical history  2024    Right L4-5 transforaminal lumbar interbody fusion      Family History   Problem Relation Age of Onset    Diabetes Father       Social History     Socioeconomic History    Marital status:      Spouse name: Not on file    Number of children: Not on file    Years of education: Not on file    Highest education level: Not on file   Occupational History    Not on file   Tobacco Use    Smoking status: Never    Smokeless tobacco: Never   Vaping Use    Vaping status: Every Day    Substances: THC, CBD   Substance and Sexual Activity     Alcohol use: Yes     Comment: very rare    Drug use: Yes     Types: Cannabis    Sexual activity: Not on file   Other Topics Concern    Not on file   Social History Narrative    Not on file     Social Drivers of Health     Food Insecurity: No Food Insecurity (7/17/2024)    Received from [facility]    Hunger Vital Sign     Worried About Running Out of Food in the Last Year: Never true     Ran Out of Food in the Last Year: Never true   Transportation Needs: No Transportation Needs (7/17/2024)    Received from [facility]    PRAPARE - Transportation     Lack of Transportation (Medical): No     Lack of Transportation (Non-Medical): No   Stress: Not on file   Housing Stability: Low Risk  (7/17/2024)    Received from [facility]    Housing Stability Vital Sign     Unable to Pay for Housing in the Last Year: No     Number of Times Moved in the Last Year: 0     Homeless in the Last Year: No        Allergies:  Allergies[1]      Medications:  Current Outpatient Medications   Medication Sig Dispense Refill    amoxicillin 500 MG Oral Tab Take 1 tablet (500 mg total) by mouth 2 (two) times daily.      MOUNJARO 5 MG/0.5ML Subcutaneous Solution Auto-injector ADMINISTER 5 MG UNDER THE SKIN WEEKLY      Tirzepatide (MOUNJARO) 5 MG/0.5ML Subcutaneous Solution Pen-injector ADMINISTER 5 MG UNDER THE SKIN WEEKLY      gabapentin 800 MG Oral Tab Take 2 tablets (1,600 mg total) by mouth nightly. TAKE AT BEDTIME      cephalexin 500 MG Oral Cap       HYDROcodone-acetaminophen  MG Oral Tab TAKE 1 TO 2 TABLETS BY MOUTH EVERY 6 HOURS FOR 3 DAYS AS NEEDED THEN TAKE 1 TABLET BY MOUTH EVERY 6 HOURS AS NEEDED FOR PAIN (Patient not taking: Reported on 8/6/2024)      methocarbamol 750 MG Oral Tab       Polyethylene Glycol 3350 17 g Oral Powd Pack Take by mouth As Directed. (Patient not taking: Reported on 8/6/2024)      tamsulosin 0.4 MG Oral Cap Take 1 capsule (0.4 mg  total) by mouth daily with dinner.      Glucose Blood (ONETOUCH VERIO) In Vitro Strip 1 each 2 (two) times daily.      Glucose Blood (ONETOUCH VERIO) In Vitro Strip Test 2 x day      Methylphenidate HCl ER 36 MG Oral Tablet 24 Hr Take 36 mg by mouth.      aspirin 81 MG Oral Tab EC Take 1 tablet (81 mg total) by mouth daily. HOLD for 5 days post operatively      docusate sodium 100 MG Oral Cap Take 100 mg by mouth 2 (two) times daily.      amphetamine-dextroamphetamine 20 MG Oral Tab Take 1 tablet (20 mg total) by mouth daily.      metFORMIN 500 MG Oral Tab Take 1 tablet (500 mg total) by mouth 2 (two) times daily with meals.      gabapentin 600 MG Oral Tab Take 1 tablet (600 mg total) by mouth at bedtime.      lisinopril 5 MG Oral Tab Take 1 tablet (5 mg total) by mouth daily.      simvastatin 20 MG Oral Tab Take 1 tablet (20 mg total) by mouth nightly.      Dulaglutide (TRULICITY) 4.5 MG/0.5ML Subcutaneous Solution Pen-injector Inject 1 Dose into the skin once a week. Patient instructed to not take any more trulicity before surgery      loratadine 10 MG Oral Tablet Dispersible Take 1 tablet (10 mg total) by mouth at bedtime.          Review of Systems:  A 10-point system was reviewed.  Pertinent positives and negatives are noted in HPI.      Physical Exam:  BP (!) 161/90 (BP Location: Right arm, Patient Position: Sitting, Cuff Size: adult)   Pulse 87   Ht 70\"   Wt 178 lb (80.7 kg)   SpO2 98%   BMI 25.54 kg/m²         Neurological Exam:    AAOx3, following commands  PERRLA  EOMI  Face symmetrical  Tongue midline  Hearing symmetrical and intact to finger rub    No rhinorrhea or otorrhea    Romberg negative    Motor Strength:  Intact in the lower extremities    Sensation to light touch:  Intact in the lower extremities    Incision:  Clean dry and intact    Abdomen:  Soft, non-distended, non-tender, with no rebound or guarding.  No peritoneal signs.     Extremities:  Non-tender, no lower extremity edema noted.       Labs:  CBC:  No results found for: \"WBC\", \"HGB\", \"HEMOGLOBIN\", \"HCT\", \"MCV\", \"PLT\"   BMG:  No results found for: \"GLUF\", \"CALCIUM\", \"NA\", \"K\", \"CO2\", \"CL\", \"BUN\", \"CREATININE\"   INR:  No results found for: \"INR\", \"PROTIME\"       Diagnostics:  I reviewed an x-ray of the lumbar spine, dated 18 February, 2025.  There is evidence of L4-5 interbody fusion without any overt complication.    Diagnosis:  1. S/P lumbar fusion      Assessment/Plan:  Our patient is doing very well at this point in time with a well-appearing x-ray.  He is discharged from our care and may follow-up with us on a as needed basis.    More than 30 minutes were spent during this visit and the coordination of this patient's care. All questions and concerns were addressed. We appreciate the opportunity to participate in the care of this patient. Please do not hesitate to call our office ([phone]) with any issues.        Francesco Lee M.D., F.A.A.N.S.    2/18/2025  11:15 AM    This note has been dictated utilizing voice recognition software. Unfortunately, this may lead to occasional typographical errors. If there are any questions regarding this, please do not hesitate to contact our office.        [1] No Known Allergies

## (undated) DEVICE — DRAPE SRG 90X60IN BCK TBL CVR

## (undated) DEVICE — SOLUTION IRRIG 1000ML 0.9% NACL USP BG

## (undated) DEVICE — ADHESIVE SKIN TOP FOR WND CLSR DERMBND ADV

## (undated) DEVICE — DRAPE C ARM TRNSPAR POLY PROTCT BARR FOR UNIV

## (undated) DEVICE — GLOVE GAMMEX PI HYBRID LF 8.0

## (undated) DEVICE — GOWN SUR LG DISP LEV 3 AERO BLU RAGLAN SL

## (undated) DEVICE — WRAP COOLING BACK W/NO PILLOW

## (undated) DEVICE — DRAPE SHEET LG

## (undated) DEVICE — GLOVE SUR 8 ENCORE MIC LTX BRN PWD F

## (undated) DEVICE — BUR SUR DIA3MM PRECIS NEURO 5 STP NOTCH

## (undated) DEVICE — MARKER SUR SKIN ST GENTIAN VLT STD REG TIP

## (undated) DEVICE — SEALER BPLR ELECTRD L5.74MM DIA3.48MM CNTR

## (undated) DEVICE — NEEDLE NRV STIM BVL TIP INSUL PEDCL ACCS SYS

## (undated) DEVICE — TRAY INSTR PWR NUVASIVE

## (undated) DEVICE — PENCIL ES BTTN SWCH W/ TIP HOLSTER E-Z CLN

## (undated) DEVICE — FORCEP BIPOLAR SPETZLER 9X1.0

## (undated) DEVICE — TRAY CATH 16FR F INCLUDE BARDX IC COMPLT CARE

## (undated) DEVICE — KIT SUR ACCS MAXCESS

## (undated) DEVICE — KIT HEMSTAT MTRX 8ML PORCINE GEL HUM THROM

## (undated) DEVICE — TUBING MEGADYNE SPECULUM

## (undated) DEVICE — APPLICATOR SKIN PREP 26ML HI LT ORNG 2% CHG

## (undated) DEVICE — SOLUTION IRRIG 1000ML 0.9% NACL USP BTL

## (undated) DEVICE — ELECTRODE ES L16.5CM BLDE MPLR OPN APPRCH EZ

## (undated) DEVICE — BAG EQP 36X36IN BAND

## (undated) DEVICE — MONITORING NEUROPHYSIOLOGICAL

## (undated) DEVICE — SUTURE VCRL SZ 3-0 L18IN ABSRB UD CP-2 L26MM

## (undated) DEVICE — SYSTEM DEL GRFT MOD MAS

## (undated) DEVICE — PACK CDS LAMINECTOMY

## (undated) DEVICE — SUTURE MCRYL SZ 3-0 L18IN ABSRB UD L19MM PS-2